# Patient Record
Sex: FEMALE | Race: WHITE | NOT HISPANIC OR LATINO | URBAN - METROPOLITAN AREA
[De-identification: names, ages, dates, MRNs, and addresses within clinical notes are randomized per-mention and may not be internally consistent; named-entity substitution may affect disease eponyms.]

---

## 2023-09-18 ENCOUNTER — HOSPITAL ENCOUNTER (OUTPATIENT)
Dept: DATA CONVERSION | Facility: HOSPITAL | Age: 58
Discharge: HOME | End: 2023-09-18

## 2023-09-18 DIAGNOSIS — R07.9 CHEST PAIN, UNSPECIFIED: ICD-10-CM

## 2023-10-11 NOTE — PROGRESS NOTES
This is a 58 year old female for UC FU for EVAL of LEG PAIN nearly 2 weeks ago and two weeks before that for LEG PAIN.    HAS PAIN THIGH and UPPER THIGH and GROIN and Rx with STEROIDS and MUSCLE RELAXERS and ENTIRE LEG and CALF.    ALSO HAD CP at first visit to UC but that resolved with NSAID.  NO FURTHER CP or SOB    SMOKER  AGE 58   NO HRT  NO LONG CAR TRIPS    NO CP or SOB    ROS is NEG for HEADACHE, NAUSEA, VOMITING, DIARRHEA, CHEST PAIN, SOB, and BLEEDING and as further REVIEWED BELOW.      Subjective   Kayley Johnson is a 58 y.o. female who presents for No chief complaint on file..    HPI:        Review of systems is essentially negative for all systems except for any identified issues in HPI above.    Objective     There were no vitals taken for this visit.     Physical Examination:       GENERAL           General Appearance: well-appearing, well-developed, well-hydrated, well-nourished, no acute distress.        HEENT           NECK supple, no masses or thyromegaly, no carotid bruit.        EYES           Extraocular Movements: normal, bilateral eyes ESTHER, no conjunctival injection.        HEART           Rate and Rhythm regular rate and rhythm. Heart sounds: normal S1S2, no S3 or S4. Murmurs: none.        CHEST           Breath sounds: Clear to IPPA, RR<16 no use of accessory muscles.        ABDOMEN           General: Neg for LKKS or masses, no scleral icterus or jaundice.        MUSCULOSKELETAL           Joints Demonstration: Neg for erythema, swelling or joint deformities. gross abnormalities no gross abnormalities.        EXTREMITIES           Lower Extremities: Neg for cyanosis, clubbing or edema.       Assessment/Plan   Problem List Items Addressed This Visit    None      FOLLOW UP:  PRN and as specified above         Xiomara Tellez M.D.

## 2023-10-18 PROBLEM — B00.9 HERPES: Status: ACTIVE | Noted: 2023-10-18

## 2023-10-18 PROBLEM — F17.200 TOBACCO USE DISORDER: Status: ACTIVE | Noted: 2023-10-18

## 2023-10-18 PROBLEM — N95.0 POST-MENOPAUSE BLEEDING: Status: ACTIVE | Noted: 2023-10-18

## 2023-10-18 RX ORDER — IBUPROFEN 200 MG
1 TABLET ORAL EVERY 24 HOURS
COMMUNITY
Start: 2020-01-08 | End: 2023-11-13 | Stop reason: WASHOUT

## 2023-10-19 ENCOUNTER — LAB (OUTPATIENT)
Dept: LAB | Facility: LAB | Age: 58
End: 2023-10-19
Payer: COMMERCIAL

## 2023-10-19 ENCOUNTER — OFFICE VISIT (OUTPATIENT)
Dept: PRIMARY CARE | Facility: CLINIC | Age: 58
End: 2023-10-19
Payer: COMMERCIAL

## 2023-10-19 ENCOUNTER — HOSPITAL ENCOUNTER (OUTPATIENT)
Dept: RADIOLOGY | Facility: HOSPITAL | Age: 58
Discharge: HOME | End: 2023-10-19
Payer: COMMERCIAL

## 2023-10-19 VITALS
WEIGHT: 103 LBS | TEMPERATURE: 97.6 F | DIASTOLIC BLOOD PRESSURE: 70 MMHG | HEART RATE: 85 BPM | SYSTOLIC BLOOD PRESSURE: 120 MMHG | OXYGEN SATURATION: 98 %

## 2023-10-19 DIAGNOSIS — M79.606 PAIN OF LOWER EXTREMITY, UNSPECIFIED LATERALITY: ICD-10-CM

## 2023-10-19 DIAGNOSIS — R07.89 OTHER CHEST PAIN: Primary | ICD-10-CM

## 2023-10-19 DIAGNOSIS — Z09 HOSPITAL DISCHARGE FOLLOW-UP: ICD-10-CM

## 2023-10-19 DIAGNOSIS — Z71.85 IMMUNIZATION COUNSELING: ICD-10-CM

## 2023-10-19 DIAGNOSIS — Z71.9 HEALTH COUNSELING: ICD-10-CM

## 2023-10-19 LAB
ANION GAP SERPL CALC-SCNC: 10 MMOL/L
BASOPHILS # BLD AUTO: 0.11 X10*3/UL (ref 0–0.1)
BASOPHILS NFR BLD AUTO: 0.8 %
BUN SERPL-MCNC: 18 MG/DL (ref 8–25)
CALCIUM SERPL-MCNC: 9.6 MG/DL (ref 8.5–10.4)
CHLORIDE SERPL-SCNC: 102 MMOL/L (ref 97–107)
CO2 SERPL-SCNC: 26 MMOL/L (ref 24–31)
CREAT SERPL-MCNC: 0.7 MG/DL (ref 0.4–1.6)
D DIMER PPP FEU-MCNC: 0.3 MG/L FEU (ref 0.19–0.5)
EOSINOPHIL # BLD AUTO: 0.23 X10*3/UL (ref 0–0.7)
EOSINOPHIL NFR BLD AUTO: 1.7 %
ERYTHROCYTE [DISTWIDTH] IN BLOOD BY AUTOMATED COUNT: 14 % (ref 11.5–14.5)
GFR SERPL CREATININE-BSD FRML MDRD: >90 ML/MIN/1.73M*2
GLUCOSE SERPL-MCNC: 96 MG/DL (ref 65–99)
HCT VFR BLD AUTO: 41.7 % (ref 36–46)
HGB BLD-MCNC: 13.7 G/DL (ref 12–16)
IMM GRANULOCYTES # BLD AUTO: 0.04 X10*3/UL (ref 0–0.7)
IMM GRANULOCYTES NFR BLD AUTO: 0.3 % (ref 0–0.9)
LYMPHOCYTES # BLD AUTO: 3.99 X10*3/UL (ref 1.2–4.8)
LYMPHOCYTES NFR BLD AUTO: 29.8 %
MCH RBC QN AUTO: 30.4 PG (ref 26–34)
MCHC RBC AUTO-ENTMCNC: 32.9 G/DL (ref 32–36)
MCV RBC AUTO: 93 FL (ref 80–100)
MONOCYTES # BLD AUTO: 1.09 X10*3/UL (ref 0.1–1)
MONOCYTES NFR BLD AUTO: 8.2 %
NEUTROPHILS # BLD AUTO: 7.91 X10*3/UL (ref 1.2–7.7)
NEUTROPHILS NFR BLD AUTO: 59.2 %
NRBC BLD-RTO: 0 /100 WBCS (ref 0–0)
PLATELET # BLD AUTO: 433 X10*3/UL (ref 150–450)
PMV BLD AUTO: 9.8 FL (ref 7.5–11.5)
POTASSIUM SERPL-SCNC: 4.5 MMOL/L (ref 3.4–5.1)
RBC # BLD AUTO: 4.5 X10*6/UL (ref 4–5.2)
SODIUM SERPL-SCNC: 138 MMOL/L (ref 133–145)
WBC # BLD AUTO: 13.4 X10*3/UL (ref 4.4–11.3)

## 2023-10-19 PROCEDURE — 4004F PT TOBACCO SCREEN RCVD TLK: CPT | Performed by: FAMILY MEDICINE

## 2023-10-19 PROCEDURE — 2550000001 HC RX 255 CONTRASTS: Performed by: FAMILY MEDICINE

## 2023-10-19 PROCEDURE — 99214 OFFICE O/P EST MOD 30 MIN: CPT | Performed by: FAMILY MEDICINE

## 2023-10-19 PROCEDURE — 80048 BASIC METABOLIC PNL TOTAL CA: CPT

## 2023-10-19 PROCEDURE — 85025 COMPLETE CBC W/AUTO DIFF WBC: CPT

## 2023-10-19 PROCEDURE — 36415 COLL VENOUS BLD VENIPUNCTURE: CPT

## 2023-10-19 PROCEDURE — 85300 ANTITHROMBIN III ACTIVITY: CPT

## 2023-10-19 PROCEDURE — 71275 CT ANGIOGRAPHY CHEST: CPT

## 2023-10-19 RX ADMIN — IOHEXOL 75 ML: 350 INJECTION, SOLUTION INTRAVENOUS at 19:13

## 2023-10-19 ASSESSMENT — PATIENT HEALTH QUESTIONNAIRE - PHQ9
1. LITTLE INTEREST OR PLEASURE IN DOING THINGS: NOT AT ALL
2. FEELING DOWN, DEPRESSED OR HOPELESS: NOT AT ALL
SUM OF ALL RESPONSES TO PHQ9 QUESTIONS 1 AND 2: 0

## 2023-10-19 ASSESSMENT — ENCOUNTER SYMPTOMS
LOSS OF SENSATION IN FEET: 0
OCCASIONAL FEELINGS OF UNSTEADINESS: 0
DEPRESSION: 0

## 2023-10-20 ENCOUNTER — TELEPHONE (OUTPATIENT)
Dept: PRIMARY CARE | Facility: CLINIC | Age: 58
End: 2023-10-20
Payer: COMMERCIAL

## 2023-10-20 NOTE — TELEPHONE ENCOUNTER
Patient left message stating she was sent for a STAT CT and US yesterday. Stating the US was never scheduled as a STAT so it did not get done. Requesting a call back to try and get this done today if possible. She did complete the CT.

## 2023-10-20 NOTE — TELEPHONE ENCOUNTER
PT called in requesting STAT order for ultrasound of left leg. States it was not ordered as STAT at appointment yesterday. Please call PT at [phone]   yes

## 2023-10-20 NOTE — TELEPHONE ENCOUNTER
Pt called back stating that Seth could get her scheduled for US on Monday at 8:00 AM. PT states that she would have to take another day off work to do that, which she would need a note from the doctor. States that Dr. Tellez wanted the US done yesterday, but still needs the order placed as STAT. Please advise.

## 2023-10-20 NOTE — TELEPHONE ENCOUNTER
Per lab encounter- dr love would like pt to have an appointment to discuss pain with her and possible treatments. Please schedule pt

## 2023-10-23 ENCOUNTER — APPOINTMENT (OUTPATIENT)
Dept: VASCULAR MEDICINE | Facility: CLINIC | Age: 58
End: 2023-10-23
Payer: COMMERCIAL

## 2023-11-02 ENCOUNTER — APPOINTMENT (OUTPATIENT)
Dept: PRIMARY CARE | Facility: CLINIC | Age: 58
End: 2023-11-02
Payer: COMMERCIAL

## 2023-11-03 NOTE — PROGRESS NOTES
"This is a 58 year old female for FU visit from having HOSP EVAL for CHEST PRESSURE and ruled out for MI and ruled out for DVT       ATTENDED  CT SCAN  NEG PE but POS for EMPHYSEMA    CONTINUES TO SMOKE 1PPD and wishes to cease    IMPRESSION:  1. No pulmonary embolus.  2. Moderate diffuse centrilobular emphysema.  3. No acute consolidation or evidence for acute thoracic pathology.          Signed by: Myles Sanchez 10/19/2023 10:00 PM  Dictation workstation:   [id number]      ROS is NEG for HEADACHE, NAUSEA, VOMITING, DIARRHEA, CHEST PAIN, SOB, and BLEEDING and as further REVIEWED BELOW      .Subjective   Kayley Johnson is a 58 y.o. female who presents for Follow-up.    HPI:        Review of systems is essentially negative for all systems except for any identified issues in HPI above.    Objective     /78   Pulse 74   Temp 36.4 °C (97.6 °F)   Ht 1.626 m (5' 4\")   Wt 48.1 kg (106 lb)   SpO2 96%   BMI 18.19 kg/m²      Physical Examination:       GENERAL           General Appearance: well-appearing, well-developed, well-hydrated, well-nourished, no acute distress.        HEENT           NECK supple, no masses or thyromegaly, no carotid bruit.        EYES           Extraocular Movements: normal, bilateral eyes ESTHER, no conjunctival injection.        HEART           Rate and Rhythm regular rate and rhythm. Heart sounds: normal S1S2, no S3 or S4. Murmurs: none.        CHEST           Breath sounds: Clear to IPPA, RR<16 no use of accessory muscles.        ABDOMEN           General: Neg for LKKS or masses, no scleral icterus or jaundice.        MUSCULOSKELETAL           Joints Demonstration: Neg for erythema, swelling or joint deformities. gross abnormalities no gross abnormalities.        EXTREMITIES           Lower Extremities: Neg for cyanosis, clubbing or edema.       Assessment/Plan   Problem List Items Addressed This Visit    None  Visit Diagnoses       Health counseling    -  Primary    Immunization " counseling                FOLLOW UP:  PRN and as specified above         Xiomara Tellez M.D.

## 2023-11-13 ENCOUNTER — OFFICE VISIT (OUTPATIENT)
Dept: PRIMARY CARE | Facility: CLINIC | Age: 58
End: 2023-11-13
Payer: COMMERCIAL

## 2023-11-13 VITALS
TEMPERATURE: 97.6 F | HEIGHT: 64 IN | BODY MASS INDEX: 18.1 KG/M2 | DIASTOLIC BLOOD PRESSURE: 78 MMHG | WEIGHT: 106 LBS | SYSTOLIC BLOOD PRESSURE: 122 MMHG | OXYGEN SATURATION: 96 % | HEART RATE: 74 BPM

## 2023-11-13 DIAGNOSIS — Z71.85 IMMUNIZATION COUNSELING: ICD-10-CM

## 2023-11-13 DIAGNOSIS — Z72.0 TOBACCO USE: ICD-10-CM

## 2023-11-13 DIAGNOSIS — M79.605 PAIN OF LEFT LOWER EXTREMITY: ICD-10-CM

## 2023-11-13 DIAGNOSIS — J43.9 PULMONARY EMPHYSEMA, UNSPECIFIED EMPHYSEMA TYPE (MULTI): ICD-10-CM

## 2023-11-13 DIAGNOSIS — Z71.9 HEALTH COUNSELING: Primary | ICD-10-CM

## 2023-11-13 DIAGNOSIS — R07.89 ATYPICAL CHEST PAIN: ICD-10-CM

## 2023-11-13 PROCEDURE — 4004F PT TOBACCO SCREEN RCVD TLK: CPT | Performed by: FAMILY MEDICINE

## 2023-11-13 PROCEDURE — 99214 OFFICE O/P EST MOD 30 MIN: CPT | Performed by: FAMILY MEDICINE

## 2023-11-13 PROCEDURE — 90662 IIV NO PRSV INCREASED AG IM: CPT | Performed by: FAMILY MEDICINE

## 2023-11-13 RX ORDER — FLUTICASONE PROPIONATE AND SALMETEROL 250; 50 UG/1; UG/1
1 POWDER RESPIRATORY (INHALATION)
Qty: 60 EACH | Refills: 11 | Status: SHIPPED | OUTPATIENT
Start: 2023-11-13 | End: 2024-11-12

## 2023-11-13 RX ORDER — IBUPROFEN 200 MG
1 TABLET ORAL EVERY 24 HOURS
Qty: 30 PATCH | Refills: 0 | Status: SHIPPED | OUTPATIENT
Start: 2023-11-13 | End: 2024-03-22 | Stop reason: SDUPTHER

## 2023-11-13 RX ORDER — ALBUTEROL SULFATE 90 UG/1
2 AEROSOL, METERED RESPIRATORY (INHALATION) EVERY 4 HOURS PRN
Qty: 8 G | Refills: 5 | Status: SHIPPED | OUTPATIENT
Start: 2023-11-13 | End: 2024-11-12

## 2023-11-13 ASSESSMENT — PATIENT HEALTH QUESTIONNAIRE - PHQ9
2. FEELING DOWN, DEPRESSED OR HOPELESS: NOT AT ALL
SUM OF ALL RESPONSES TO PHQ9 QUESTIONS 1 AND 2: 0
1. LITTLE INTEREST OR PLEASURE IN DOING THINGS: NOT AT ALL

## 2023-11-13 ASSESSMENT — PAIN SCALES - GENERAL: PAINLEVEL: 0-NO PAIN

## 2023-12-28 ENCOUNTER — CLINICAL SUPPORT (OUTPATIENT)
Dept: VASCULAR MEDICINE | Facility: CLINIC | Age: 58
End: 2023-12-28
Payer: COMMERCIAL

## 2023-12-28 DIAGNOSIS — M79.605 PAIN OF LEFT LOWER EXTREMITY: ICD-10-CM

## 2023-12-28 PROCEDURE — 93971 EXTREMITY STUDY: CPT

## 2023-12-28 PROCEDURE — 93971 EXTREMITY STUDY: CPT | Performed by: INTERNAL MEDICINE

## 2024-01-03 ENCOUNTER — OFFICE VISIT (OUTPATIENT)
Dept: CARDIOLOGY | Facility: CLINIC | Age: 59
End: 2024-01-03
Payer: COMMERCIAL

## 2024-01-03 ENCOUNTER — HOSPITAL ENCOUNTER (OUTPATIENT)
Dept: CARDIOLOGY | Facility: CLINIC | Age: 59
Discharge: HOME | End: 2024-01-03
Payer: COMMERCIAL

## 2024-01-03 VITALS
BODY MASS INDEX: 18.27 KG/M2 | DIASTOLIC BLOOD PRESSURE: 68 MMHG | HEART RATE: 67 BPM | SYSTOLIC BLOOD PRESSURE: 102 MMHG | OXYGEN SATURATION: 95 % | WEIGHT: 107 LBS | HEIGHT: 64 IN | RESPIRATION RATE: 16 BRPM

## 2024-01-03 DIAGNOSIS — R07.89 ATYPICAL CHEST PAIN: ICD-10-CM

## 2024-01-03 DIAGNOSIS — R00.2 PALPITATIONS: ICD-10-CM

## 2024-01-03 DIAGNOSIS — R07.9 CHEST PAIN, UNSPECIFIED TYPE: ICD-10-CM

## 2024-01-03 DIAGNOSIS — R06.02 SHORTNESS OF BREATH: ICD-10-CM

## 2024-01-03 PROCEDURE — 99214 OFFICE O/P EST MOD 30 MIN: CPT | Performed by: INTERNAL MEDICINE

## 2024-01-03 PROCEDURE — 93010 ELECTROCARDIOGRAM REPORT: CPT | Performed by: INTERNAL MEDICINE

## 2024-01-03 PROCEDURE — 99204 OFFICE O/P NEW MOD 45 MIN: CPT | Performed by: INTERNAL MEDICINE

## 2024-01-03 PROCEDURE — 93005 ELECTROCARDIOGRAM TRACING: CPT

## 2024-01-03 ASSESSMENT — PAIN SCALES - GENERAL
PAINLEVEL: 0-NO PAIN
PAINLEVEL: 0-NO PAIN

## 2024-01-03 ASSESSMENT — ENCOUNTER SYMPTOMS
LOSS OF SENSATION IN FEET: 0
DEPRESSION: 0
OCCASIONAL FEELINGS OF UNSTEADINESS: 0

## 2024-01-03 NOTE — PROGRESS NOTES
Subjective   Kayley Johnson is a 58 y.o. female.    Chief Complaint:  This is a 58 year old female for FU visit from having [facility] EVAL for CHEST PRESSURE and SOB.  HPI  This is a 57 y/o female here today for an ER follow up visit. She was seen at [facility] in October with chest pain, leg pain, and sob- see notes. Complains of rare heart palpitations. Reviewed medical /surgical history, lab/test results, and current medications. She is a current 1 PPD cigarette smoker and is planning on quitting soon. An EKG was done today.    ROS    Objective   Physical Exam  Gen.: Pleasant,  y/o  in no obvious distress.   HEENT: Normal EOMs without thyromegaly or lymphadenopathy.  Lungs: Clear with good air movement no crackles or wheezing.  Carotid: Normal JVP and carotid upstrokes without bruit.   Cardiac: There is a normal S1 and S2 with normal heart tones and no murmur rub or S3.  Abdomen: Nontender with normal bowel sounds and no bruits.  Extremities: No edema.  Skin: No acute rash.  Neuro: Intact without focal motor deficit.  Vascular: Normal radial pulses bilaterally. Normal distal pulses bilaterally.      Lab Review:   Lab on 10/19/2023   Component Date Value    D-Dimer Non VTE, Quant (* 10/19/2023 0.30     WBC 10/19/2023 13.4 (H)     nRBC 10/19/2023 0.0     RBC 10/19/2023 4.50     Hemoglobin 10/19/2023 13.7     Hematocrit 10/19/2023 41.7     MCV 10/19/2023 93     MCH 10/19/2023 30.4     MCHC 10/19/2023 32.9     RDW 10/19/2023 14.0     Platelets 10/19/2023 433     MPV 10/19/2023 9.8     Neutrophils % 10/19/2023 59.2     Immature Granulocytes %,* 10/19/2023 0.3     Lymphocytes % 10/19/2023 29.8     Monocytes % 10/19/2023 8.2     Eosinophils % 10/19/2023 1.7     Basophils % 10/19/2023 0.8     Neutrophils Absolute 10/19/2023 7.91 (H)     Immature Granulocytes Ab* 10/19/2023 0.04     Lymphocytes Absolute 10/19/2023 3.99     Monocytes Absolute 10/19/2023 1.09 (H)     Eosinophils Absolute 10/19/2023 0.23     Basophils Absolute  10/19/2023 0.11 (H)     Glucose 10/19/2023 96     Sodium 10/19/2023 138     Potassium 10/19/2023 4.5     Chloride 10/19/2023 102     Bicarbonate 10/19/2023 26     Urea Nitrogen 10/19/2023 18     Creatinine 10/19/2023 0.70     eGFR 10/19/2023 >90     Calcium 10/19/2023 9.6     Anion Gap 10/19/2023 10        Assessment/Plan   There were no encounter diagnoses.

## 2024-01-08 LAB
ATRIAL RATE: 67 BPM
P AXIS: 81 DEGREES
P OFFSET: 198 MS
P ONSET: 147 MS
PR INTERVAL: 148 MS
Q ONSET: 221 MS
QRS COUNT: 12 BEATS
QRS DURATION: 86 MS
QT INTERVAL: 398 MS
QTC CALCULATION(BAZETT): 420 MS
QTC FREDERICIA: 413 MS
R AXIS: 73 DEGREES
T AXIS: 66 DEGREES
T OFFSET: 420 MS
VENTRICULAR RATE: 67 BPM

## 2024-03-06 ENCOUNTER — HOSPITAL ENCOUNTER (OUTPATIENT)
Dept: RADIOLOGY | Facility: HOSPITAL | Age: 59
Discharge: HOME | End: 2024-03-06
Payer: COMMERCIAL

## 2024-03-06 DIAGNOSIS — R06.02 SHORTNESS OF BREATH: ICD-10-CM

## 2024-03-06 DIAGNOSIS — R07.89 ATYPICAL CHEST PAIN: ICD-10-CM

## 2024-03-06 PROCEDURE — 75571 CT HRT W/O DYE W/CA TEST: CPT

## 2024-03-22 ENCOUNTER — PATIENT MESSAGE (OUTPATIENT)
Dept: PRIMARY CARE | Facility: CLINIC | Age: 59
End: 2024-03-22
Payer: COMMERCIAL

## 2024-03-22 DIAGNOSIS — Z72.0 TOBACCO USE: ICD-10-CM

## 2024-03-22 RX ORDER — IBUPROFEN 200 MG
1 TABLET ORAL EVERY 24 HOURS
Qty: 30 PATCH | Refills: 0 | Status: SHIPPED | OUTPATIENT
Start: 2024-03-22 | End: 2024-04-21

## 2024-04-03 ENCOUNTER — OFFICE VISIT (OUTPATIENT)
Dept: CARDIOLOGY | Facility: CLINIC | Age: 59
End: 2024-04-03
Payer: COMMERCIAL

## 2024-04-03 VITALS
DIASTOLIC BLOOD PRESSURE: 68 MMHG | HEART RATE: 70 BPM | WEIGHT: 104 LBS | HEIGHT: 64 IN | OXYGEN SATURATION: 98 % | SYSTOLIC BLOOD PRESSURE: 138 MMHG | RESPIRATION RATE: 16 BRPM | BODY MASS INDEX: 17.75 KG/M2

## 2024-04-03 DIAGNOSIS — R00.2 PALPITATIONS: ICD-10-CM

## 2024-04-03 DIAGNOSIS — R07.89 OTHER CHEST PAIN: ICD-10-CM

## 2024-04-03 DIAGNOSIS — R06.02 SHORTNESS OF BREATH: ICD-10-CM

## 2024-04-03 DIAGNOSIS — R07.9 CHEST PAIN, UNSPECIFIED TYPE: Primary | ICD-10-CM

## 2024-04-03 PROCEDURE — 99214 OFFICE O/P EST MOD 30 MIN: CPT | Performed by: INTERNAL MEDICINE

## 2024-04-03 RX ORDER — ATORVASTATIN CALCIUM 40 MG/1
40 TABLET, FILM COATED ORAL DAILY
Qty: 90 TABLET | Refills: 3 | Status: SHIPPED | OUTPATIENT
Start: 2024-04-03

## 2024-04-03 ASSESSMENT — ENCOUNTER SYMPTOMS
LOSS OF SENSATION IN FEET: 0
DEPRESSION: 0
OCCASIONAL FEELINGS OF UNSTEADINESS: 0

## 2024-04-03 ASSESSMENT — PAIN SCALES - GENERAL: PAINLEVEL: 0-NO PAIN

## 2024-04-03 NOTE — PROGRESS NOTES
Subjective   Kayley Johnson is a 58 y.o. female.    Chief Complaint:  This is a 58 year old female for FU visit  for chest pain, heart palpitations and Dyspnea.     HPI  This is a 57 y/o female here today for a three month Cardiology follow up visit. She was seen at [facility] in October with chest pain, leg pain, and sob- see notes. Complains of rare heart palpitations. Reviewed lab work results and current medications. She is a former cigarette smoker and continues to have an occasional cigarette. Her CT Cardiac Calcium score is 83.     Objective   Physical Exam  Gen.: Pleasant,  y/o  in no obvious distress.   HEENT: Normal EOMs without thyromegaly or lymphadenopathy.  Lungs: Clear with good air movement no crackles or wheezing.  Carotid: Normal JVP and carotid upstrokes without bruit.   Cardiac: There is a normal S1 and S2 with normal heart tones and no murmur rub or S3.  Abdomen: Nontender with normal bowel sounds and no bruits.  Extremities: No edema.  Skin: No acute rash.  Neuro: Intact without focal motor deficit.  Vascular: Normal radial pulses bilaterally. Normal distal pulses bilaterally.    Lab Review:   Office Visit on 01/03/2024   Component Date Value    Ventricular Rate 01/04/2024 67     Atrial Rate 01/04/2024 67     AL Interval 01/04/2024 148     QRS Duration 01/04/2024 86     QT Interval 01/04/2024 398     QTC Calculation(Bazett) 01/04/2024 420     P Axis 01/04/2024 81     R Axis 01/04/2024 73     T Axis 01/04/2024 66     QRS Count 01/04/2024 12     Q Onset 01/04/2024 221     P Onset 01/04/2024 147     P Offset 01/04/2024 198     T Offset 01/04/2024 420     QTC Fredericia 01/04/2024 413    Lab on 10/19/2023   Component Date Value    D-Dimer Non VTE, Quant (* 10/19/2023 0.30     WBC 10/19/2023 13.4 (H)     nRBC 10/19/2023 0.0     RBC 10/19/2023 4.50     Hemoglobin 10/19/2023 13.7     Hematocrit 10/19/2023 41.7     MCV 10/19/2023 93     MCH 10/19/2023 30.4     MCHC 10/19/2023 32.9     RDW 10/19/2023  14.0     Platelets 10/19/2023 433     MPV 10/19/2023 9.8     Neutrophils % 10/19/2023 59.2     Immature Granulocytes %,* 10/19/2023 0.3     Lymphocytes % 10/19/2023 29.8     Monocytes % 10/19/2023 8.2     Eosinophils % 10/19/2023 1.7     Basophils % 10/19/2023 0.8     Neutrophils Absolute 10/19/2023 7.91 (H)     Immature Granulocytes Ab* 10/19/2023 0.04     Lymphocytes Absolute 10/19/2023 3.99     Monocytes Absolute 10/19/2023 1.09 (H)     Eosinophils Absolute 10/19/2023 0.23     Basophils Absolute 10/19/2023 0.11 (H)     Glucose 10/19/2023 96     Sodium 10/19/2023 138     Potassium 10/19/2023 4.5     Chloride 10/19/2023 102     Bicarbonate 10/19/2023 26     Urea Nitrogen 10/19/2023 18     Creatinine 10/19/2023 0.70     eGFR 10/19/2023 >90     Calcium 10/19/2023 9.6     Anion Gap 10/19/2023 10        Assessment/Plan   There were no encounter diagnoses.    1.  Coronary artery disease.  This patient has cardiac risk factors that are negative for hypertension and diabetes but positive for chronic smoking of 1 pack/day..  The patient did have an EKG performed on 1/3/2024  Demonstrating sinus rhythm with left ventricular hypertrophy by voltage.  In the absence of any concerning symptoms and in view of the lower CT calcium score will consider stress testing at this time but she will be started on treatment for hyperlipidemia.  Continue medical return visit in 8/2024 she will be scheduled for an echo will also be scheduled for an abdominal ultrasound to rule out abdominal aortic aneurysm given her history of smoking.  Patient works as a  at [employer]    2.  Thoracic aortic aneurysm.  The patient's CT coronary calcium score measured the ascending thoracic aorta diameter at 3.7 cm in addition to demonstrating close emphysema (to 2 cm.  She will have an echocardiogram performed at next visit in 8/2024 to assess her thoracic aorta.    3.  Chronic smoking.  Smoking cessation  encouraged.    4.  COPD.  Patient had a CT angiogram of the chest on 10/19/2023 negative for pulmonary embolization but showing moderate diffuse centrilobular emphysema with apical fibrotic changes right greater than left.    5.  Hyperlipidemia.  The patient has actually not had a lipid panel drawn and this will be ordered before her next visit.  Given the findings on her CT coronary calcium score she will be started on atorvastatin 40 mg daily.    6.  Status post  section x 2.    7.  Status post right pulm operation.    8.?  Family history of heart disease.  Patient's father evidently had aortic valve insufficiency

## 2024-04-03 NOTE — PATIENT INSTRUCTIONS
Follow up in six months with an ECHO and Abdominal U/S ([facility])  Obtain a Lipid panel this week  Take Atorvastatin 40 mg once daily

## 2024-04-04 NOTE — PROGRESS NOTES
NO show was in fact NOT a no show but RECEPTION DESK human error when she was checked in the wrong item was checked as a NO SHOW--so patient is GIVEN a COURTESY CALL Wednesday April 10th)  from Dr Tellez to apologize that this happened to her.    SEEKING REFERRAL to PULMONARY due to findings of EMPHYSEMA  REFERRED to VASC SURG for arterial testing of legs:    She had referred you to these due to the pain in the left lower extremity- per her note. If you are having ongoing concerns and would like her to re evaluate you, please call us to schedule at [phone]. The specialist is call to schedule at [phone]          She has visit scheduled on Thursday this week  for REFERRAL TO VASC SURG for ARTERIAL TESTING LEGS for pain in limbs and also to PULM for emphysema

## 2024-04-08 ENCOUNTER — LAB (OUTPATIENT)
Dept: LAB | Facility: LAB | Age: 59
End: 2024-04-08
Payer: COMMERCIAL

## 2024-04-08 DIAGNOSIS — R06.02 SHORTNESS OF BREATH: ICD-10-CM

## 2024-04-08 DIAGNOSIS — R00.2 PALPITATIONS: ICD-10-CM

## 2024-04-08 DIAGNOSIS — R07.9 CHEST PAIN, UNSPECIFIED TYPE: ICD-10-CM

## 2024-04-08 LAB
CHOLEST SERPL-MCNC: 194 MG/DL (ref 0–199)
CHOLESTEROL/HDL RATIO: 3.2
HDLC SERPL-MCNC: 60.6 MG/DL
LDLC SERPL CALC-MCNC: 101 MG/DL
NON HDL CHOLESTEROL: 133 MG/DL (ref 0–149)
TRIGL SERPL-MCNC: 161 MG/DL (ref 0–149)
VLDL: 32 MG/DL (ref 0–40)

## 2024-04-08 PROCEDURE — 36415 COLL VENOUS BLD VENIPUNCTURE: CPT

## 2024-04-08 PROCEDURE — 80061 LIPID PANEL: CPT

## 2024-04-09 ENCOUNTER — OFFICE VISIT (OUTPATIENT)
Dept: PRIMARY CARE | Facility: CLINIC | Age: 59
End: 2024-04-09
Payer: COMMERCIAL

## 2024-04-09 DIAGNOSIS — Z72.0 TOBACCO USE: Primary | ICD-10-CM

## 2024-04-09 DIAGNOSIS — J43.9 PULMONARY EMPHYSEMA, UNSPECIFIED EMPHYSEMA TYPE (MULTI): ICD-10-CM

## 2024-04-09 DIAGNOSIS — R07.89 OTHER CHEST PAIN: ICD-10-CM

## 2024-04-09 DIAGNOSIS — R07.89 ATYPICAL CHEST PAIN: ICD-10-CM

## 2024-04-11 ENCOUNTER — OFFICE VISIT (OUTPATIENT)
Dept: PRIMARY CARE | Facility: CLINIC | Age: 59
End: 2024-04-11
Payer: COMMERCIAL

## 2024-04-11 VITALS
DIASTOLIC BLOOD PRESSURE: 70 MMHG | WEIGHT: 102 LBS | SYSTOLIC BLOOD PRESSURE: 124 MMHG | OXYGEN SATURATION: 98 % | TEMPERATURE: 98 F | HEART RATE: 61 BPM | BODY MASS INDEX: 17.51 KG/M2

## 2024-04-11 DIAGNOSIS — Z87.891 QUIT SMOKING: ICD-10-CM

## 2024-04-11 DIAGNOSIS — M79.604 PAIN IN BOTH LOWER EXTREMITIES: ICD-10-CM

## 2024-04-11 DIAGNOSIS — R07.89 ATYPICAL CHEST PAIN: ICD-10-CM

## 2024-04-11 DIAGNOSIS — R07.89 OTHER CHEST PAIN: ICD-10-CM

## 2024-04-11 DIAGNOSIS — M79.606 PAIN OF LOWER EXTREMITY, UNSPECIFIED LATERALITY: ICD-10-CM

## 2024-04-11 DIAGNOSIS — M79.605 PAIN OF LEFT LOWER EXTREMITY: ICD-10-CM

## 2024-04-11 DIAGNOSIS — M79.605 PAIN IN BOTH LOWER EXTREMITIES: ICD-10-CM

## 2024-04-11 DIAGNOSIS — J43.9 PULMONARY EMPHYSEMA, UNSPECIFIED EMPHYSEMA TYPE (MULTI): Primary | ICD-10-CM

## 2024-04-11 DIAGNOSIS — F43.9 STRESS: ICD-10-CM

## 2024-04-11 PROCEDURE — 99214 OFFICE O/P EST MOD 30 MIN: CPT | Performed by: FAMILY MEDICINE

## 2024-04-11 ASSESSMENT — PATIENT HEALTH QUESTIONNAIRE - PHQ9
1. LITTLE INTEREST OR PLEASURE IN DOING THINGS: NOT AT ALL
SUM OF ALL RESPONSES TO PHQ9 QUESTIONS 1 AND 2: 0
2. FEELING DOWN, DEPRESSED OR HOPELESS: NOT AT ALL

## 2024-04-11 ASSESSMENT — PAIN SCALES - GENERAL: PAINLEVEL: 0-NO PAIN

## 2024-04-11 NOTE — PROGRESS NOTES
"Eladio is a 58 year old female for \"REFERRALS\" and states \"MY LIFE IS CRAZY, got back with a BF who now has CANCER and is in ICU and DAD in a home with Fx Hip\"    NEEDS COUNSELING for stress but NO THOUGHTS of self or other harm    RECENTLY QUIT SMOKING     Xiomara Hess MD  10/20/2023  7:41 AM EDT       CT ANGIO shows no PE consistent with the NEG D Dimer also seen on labs but DOES show MODERATE EMPHYSEMA and pt has elevated WBC count consistent with recent illness so set up a DEDICATED elective FU visit for the elevated WBC as noted in lab report commented upon today.         ROS is NEG for HEADACHE, NAUSEA, VOMITING, DIARRHEA, CHEST PAIN, SOB, and BLEEDING and as further REVIEWED BELOW.    Subjective   Kayley Johnson is a 58 y.o. female who presents for No chief complaint on file..    HPI:    Per nursing intake, pt here for No chief complaint on file.       Review of systems is essentially negative for all systems except for any identified issues in HPI above.    Objective     There were no vitals taken for this visit.     Physical Examination:       GENERAL           General Appearance: well-appearing, well-developed, well-hydrated, well-nourished, no acute distress.        HEENT           NECK supple, no masses or thyromegaly, no carotid bruit.        EYES           Extraocular Movements: normal, bilateral eyes ESTHER, no conjunctival injection.        HEART           Rate and Rhythm regular rate and rhythm. Heart sounds: normal S1S2, no S3 or S4. Murmurs: none.        CHEST           Breath sounds: Clear to IPPA, RR<16 no use of accessory muscles.        ABDOMEN           General: Neg for LKKS or masses, no scleral icterus or jaundice.        MUSCULOSKELETAL           Joints Demonstration: Neg for erythema, swelling or joint deformities. gross abnormalities no gross abnormalities.        EXTREMITIES           Lower Extremities: Neg for cyanosis, clubbing or edema.       Assessment/Plan   Problem List " Items Addressed This Visit    None  Visit Diagnoses       Atypical chest pain    -  Primary    Pulmonary emphysema, unspecified emphysema type (CMS/HCC)        Other chest pain        Pain of left lower extremity        Pain of lower extremity, unspecified laterality        Tobacco use                FOLLOW UP:  PRN and as specified above         Xiomara Tellez M.D.

## 2024-05-09 ENCOUNTER — OFFICE VISIT (OUTPATIENT)
Dept: VASCULAR SURGERY | Facility: CLINIC | Age: 59
End: 2024-05-09
Payer: COMMERCIAL

## 2024-05-09 VITALS
BODY MASS INDEX: 17.64 KG/M2 | WEIGHT: 103.3 LBS | DIASTOLIC BLOOD PRESSURE: 70 MMHG | OXYGEN SATURATION: 96 % | SYSTOLIC BLOOD PRESSURE: 120 MMHG | HEART RATE: 79 BPM | HEIGHT: 64 IN

## 2024-05-09 DIAGNOSIS — M79.604 PAIN IN BOTH LOWER EXTREMITIES: ICD-10-CM

## 2024-05-09 DIAGNOSIS — M79.605 PAIN IN BOTH LOWER EXTREMITIES: ICD-10-CM

## 2024-05-09 PROCEDURE — 99214 OFFICE O/P EST MOD 30 MIN: CPT | Performed by: SURGERY

## 2024-05-09 PROCEDURE — 99204 OFFICE O/P NEW MOD 45 MIN: CPT | Performed by: SURGERY

## 2024-05-09 ASSESSMENT — PAIN SCALES - GENERAL: PAINLEVEL: 0-NO PAIN

## 2024-05-09 ASSESSMENT — COLUMBIA-SUICIDE SEVERITY RATING SCALE - C-SSRS
1. IN THE PAST MONTH, HAVE YOU WISHED YOU WERE DEAD OR WISHED YOU COULD GO TO SLEEP AND NOT WAKE UP?: NO
2. HAVE YOU ACTUALLY HAD ANY THOUGHTS OF KILLING YOURSELF?: NO
6. HAVE YOU EVER DONE ANYTHING, STARTED TO DO ANYTHING, OR PREPARED TO DO ANYTHING TO END YOUR LIFE?: NO

## 2024-05-09 NOTE — PATIENT INSTRUCTIONS
- Left lower extremity arterial duplex to evaluate arterial flow, intramuscular hematoma, compartment edema. Suspect this is more musculoskeletal/traumatic in origin and not due to vascular disease  - Continue working on smoking cessation  - Followup in 2 weeks

## 2024-05-09 NOTE — PROGRESS NOTES
Vascular Surgery Clinic Note    CC: LLE pain    HPI:  Kayley Johnson is 58 y.o. female with history of LLE leg pain x several months. Patient states she tripped over her dog and had a left thigh muscle strain months ago that may have correlated to onset. Symptoms are not improving. She is awakening at night due to the leg pain. She denies any rest pain, claudication, or leg wounds. 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 She does smoke but has cut down to 10 cigarettes a day. No other vascular history; no hx of DVT/PE and is not on any anticoagulation    Past Vascular History:  None    Past Vascular Testing:  RAYNE MENSAH venous duplex (12/28/23) - no DVT bilaterally    Medical History:  Patient Active Problem List   Diagnosis    Herpes    Post-menopause bleeding    Tobacco use disorder        Meds:   Current Outpatient Medications on File Prior to Visit   Medication Sig Dispense Refill    albuterol (Ventolin HFA) 90 mcg/actuation inhaler Inhale 2 puffs every 4 hours if needed for wheezing or shortness of breath. 8 g 5    atorvastatin (Lipitor) 40 mg tablet Take 1 tablet (40 mg) by mouth once daily. 90 tablet 3    fluticasone propion-salmeteroL (Advair Diskus) 250-50 mcg/dose diskus inhaler Inhale 1 puff 2 times a day. Rinse mouth with water after use to reduce aftertaste and incidence of candidiasis. Do not swallow. 60 each 11    nicotine (Nicoderm CQ) 21 mg/24 hr patch Place 1 patch over 24 hours on the skin once every 24 hours. 30 patch 0     No current facility-administered medications on file prior to visit.        Allergies:   No Known Allergies    SH:    Social Determinants of Health     Tobacco Use: High Risk (5/9/2024)    Patient History     Smoking Tobacco Use: Every Day     Smokeless Tobacco Use: Never     Passive Exposure: Not on file   Alcohol Use: Not on file   Financial Resource Strain: Not on file   Food Insecurity: Not on file   Transportation Needs: Not on file   Physical Activity: Not  on file   Stress: Not on file   Social Connections: Not on file   Intimate Partner Violence: Not on file   Depression: Not at risk (5/9/2024)    PHQ-2     PHQ-2 Score: 0   Housing Stability: Not on file   Utilities: Not on file   Digital Equity: Not on file   Health Literacy: Not on file        FH:  Family History   Problem Relation Name Age of Onset    Mental illness Mother      Hypertension Mother      Dementia Mother      Cancer Father      Hypertension Father      Pancreatic cancer Father      No Known Problems Sister      No Known Problems Sister      No Known Problems Sister      No Known Problems Son      No Known Problems Son      Cancer Paternal Grandmother      Cancer Paternal Grandfather      Other (mouth cancer) Paternal Grandfather          ROS:  All systems were reviewed and are negative except as per HPI.    Objective:  Vitals:  Vitals:    05/09/24 0913   BP: 120/70   Pulse: 79   SpO2:         Exam:  Constitutional: normal, well appearing  HEENT: normocephalic  CV: regular rate  RESP: symmetric expansion, unlabored breathing  GI: soft, nontender, nondistended  MSK: normal ROM  INT: no lesions  PSYCH: appropriate mood  NEURO: no deficits  VASC: Strong femoral, DP and PT pulses bilaterally. No feet wounds. No change in pulses with dorsiflexion or plantar flexion. No pain with dorsiflexion    Assessment & Plan:  Kayley Johnson is 58 y.o. female with left lower leg pain    - Left lower extremity arterial duplex to evaluate arterial flow, intramuscular hematoma, compartment edema. Suspect this is more musculoskeletal/traumatic in origin and not due to vascular disease  - Continue working on smoking cessation  - Followup in 2 weeks      Meds  - Atorvastatin 40 mg    Screening/Surveillance  - LLE arterial duplex (May 2024)    Next Followup  - 2 weeks    Anum Dean MD

## 2024-05-13 ENCOUNTER — APPOINTMENT (OUTPATIENT)
Dept: VASCULAR SURGERY | Facility: HOSPITAL | Age: 59
End: 2024-05-13
Payer: COMMERCIAL

## 2024-05-27 ENCOUNTER — APPOINTMENT (OUTPATIENT)
Dept: VASCULAR MEDICINE | Facility: CLINIC | Age: 59
End: 2024-05-27
Payer: COMMERCIAL

## 2024-05-31 ENCOUNTER — APPOINTMENT (OUTPATIENT)
Dept: PULMONOLOGY | Facility: CLINIC | Age: 59
End: 2024-05-31
Payer: COMMERCIAL

## 2024-06-13 NOTE — PROGRESS NOTES
Patient: Kayley Johnson    [id number]  : 1965 -- AGE 58 y.o.    Provider: HEATHER Leyva     [facility]   Service Date: 2024       Department of Medicine  Division of Pulmonary, Critical Care, and Sleep Medicine       [facility]  New Visit Note    HISTORY OF PRESENT ILLNESS     The patient's referring provider is: Xiomara Hess, *    PCP: Dr. Xiomara Hess   Cardiology: Dr. Mays    HISTORY OF PRESENT ILLNESS   Kayley Johnson is a 58 y.o. female who presents to a [facility] for an evaluation with concerns of emphysema.  I have independently interviewed and examined the patient in the office and reviewed available records.    Current History  Patient presents to pulmonary clinic today after referral by primary care for concerns of emphysema.  CTA chest from 10/19/2023 showed no concerning pulmonary findings but did show moderate diffuse centrilobular emphysema.  Cardiac scoring CT from 3/6/2024 showed no gross evidence of mediastinal or hilar lymphadenopathy along with no notable lung nodules.  Visualized segments of the lungs did show mild to moderate emphysematous changes.  There is no PFT on record.    On today's visit, the patient reports no pulmonary symptoms at this time. Under a lot of stress due to her boyfriend going through cancer. Reports a dry cough. No wheezing. No SOB at rest or notable BROWN. Can walk for longer distances without issue. 3 years ago, she had a SOB episode while cutting her grass in a hot temperature. No current GERD. No lower leg edema. No CP, palpitations. Runny nose in the AM; otherwise not bothersome. Lost about 7 lbs over the past month unintentionally; believes this is related to a lot of stress lately. Has been given Advair 250 in the past; prescribed by PCP 2023 but not currently using. Same with Albuterol HFA.    Denies premature birth. No  childhood pulmonary issues growing up. No pulmonary hospitalizations. Has never been on home oxygen therapy before.    Has never completed a sleep study before. Reports snoring. No AM headaches. No daytime fatigue.     CAT Today: 1  ACT Today: 25  ESS Today: 5    Prior Notes & History       REVIEW OF SYSTEMS     REVIEW OF SYSTEMS  Review of Systems   Constitutional:  Negative for activity change, appetite change, chills, fatigue, fever and unexpected weight change.   HENT:  Negative for congestion, postnasal drip, rhinorrhea, sinus pressure, sinus pain, sneezing, sore throat, trouble swallowing and voice change.         Denies throat clearing   Eyes:  Negative for redness and itching.   Respiratory:  Negative for cough, chest tightness, shortness of breath, wheezing and stridor.    Cardiovascular:  Negative for chest pain, palpitations and leg swelling.        Denies orthopnea   Gastrointestinal:  Negative for abdominal pain, diarrhea, nausea and vomiting.        Denies acid reflux   Musculoskeletal:  Negative for arthralgias, back pain, joint swelling and myalgias.   Skin:  Negative for rash.   Allergic/Immunologic: Negative for immunocompromised state.   Neurological:  Negative for dizziness, tremors, weakness and headaches.   Hematological:  Does not bruise/bleed easily.   Psychiatric/Behavioral:  Negative for agitation and sleep disturbance. The patient is not nervous/anxious.         Denies depression   All other systems reviewed and are negative.      ALLERGIES AND MEDICATIONS     ALLERGIES  No Known Allergies    MEDICATIONS  Current Outpatient Medications   Medication Sig Dispense Refill    albuterol (Ventolin HFA) 90 mcg/actuation inhaler Inhale 2 puffs every 4 hours if needed for wheezing or shortness of breath. 8 g 5    atorvastatin (Lipitor) 40 mg tablet Take 1 tablet (40 mg) by mouth once daily. (Patient not taking: Reported on 6/14/2024) 90 tablet 3    fluticasone propion-salmeteroL (Advair Diskus)  250-50 mcg/dose diskus inhaler Inhale 1 puff 2 times a day. Rinse mouth with water after use to reduce aftertaste and incidence of candidiasis. Do not swallow. (Patient not taking: Reported on 6/14/2024) 60 each 11    nicotine (Nicoderm CQ) 21 mg/24 hr patch Place 1 patch over 24 hours on the skin once every 24 hours. (Patient not taking: Reported on 6/14/2024) 30 patch 0     No current facility-administered medications for this visit.       PAST HISTORY     PAST MEDICAL HISTORY  She  has no past medical history on file.    PAST SURGICAL HISTORY  History reviewed. No pertinent surgical history.    IMMUNIZATION HISTORY  Immunization History   Administered Date(s) Administered    DTaP vaccine, pediatric  (INFANRIX) 07/08/2011    Flu vaccine (IIV4), preservative free *Check age/dose* 10/12/2018, 10/03/2020, 09/27/2021    Flu vaccine, quadrivalent, high-dose, preservative free, age 65y+ (FLUZONE) 11/13/2023    Flu vaccine, quadrivalent, no egg protein, age 6 month or greater (FLUCELVAX) 10/21/2022    PPD Test 07/28/2017    Tdap vaccine, age 7 year and older (BOOSTRIX, ADACEL) 11/05/2007       SOCIAL HISTORY  She  reports that she quit smoking about 2 months ago. Her smoking use included cigarettes. She started smoking about 46 years ago. She has a 46.3 pack-year smoking history. She has never used smokeless tobacco. She reports that she does not currently use drugs. She reports that she does not drink alcohol.   Smoked on/off for the past 46 years.  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  DOES/DOES NOT: does have exposure to birds or exotic animals.  - Pet bird 4 years ago    FAMILY HISTORY  Family History   Problem Relation Name Age of Onset    Mental illness Mother      Hypertension Mother    "   Dementia Mother      Cancer Father      Hypertension Father      Pancreatic cancer Father      No Known Problems Sister      No Known Problems Sister      No Known Problems Sister      No Known Problems Son      No Known Problems Son      Cancer Paternal Grandmother      Cancer Paternal Grandfather      Other (mouth cancer) Paternal Grandfather           PHYSICAL EXAM     VITAL SIGNS: BP (!) 88/49   Pulse 80   Ht 1.626 m (5' 4\")   Wt 45.4 kg (100 lb)   SpO2 97%   BMI 17.16 kg/m²      PREVIOUS WEIGHTS:  Wt Readings from Last 3 Encounters:   06/14/24 45.4 kg (100 lb)   05/09/24 46.9 kg (103 lb 4.8 oz)   04/11/24 46.3 kg (102 lb)       Physical Exam  Vitals reviewed.   Constitutional:       General: She is not in acute distress.     Appearance: Normal appearance. She is not ill-appearing or toxic-appearing.   HENT:      Head: Normocephalic.      Nose: No rhinorrhea.   Cardiovascular:      Rate and Rhythm: Normal rate and regular rhythm.      Heart sounds: Normal heart sounds.   Pulmonary:      Effort: Pulmonary effort is normal. No respiratory distress.      Breath sounds: Normal breath sounds. No stridor. No wheezing, rhonchi or rales.      Comments: Mildly diminished lung sounds bilateral upper lobes  Abdominal:      General: Abdomen is flat.   Musculoskeletal:         General: Normal range of motion.      Right lower leg: No edema.      Left lower leg: No edema.   Skin:     General: Skin is warm and dry.      Nails: There is no clubbing.   Neurological:      General: No focal deficit present.      Mental Status: She is alert and oriented to person, place, and time.   Psychiatric:         Mood and Affect: Mood normal.         Behavior: Behavior normal.         Judgment: Judgment normal.         RESULTS/DATA     Pulmonary Function Test Results     No PFT on record    Chest Radiograph   CXR  9/18/23: IMPRESSION: 1. Hyperinflation of the lungs suspicious for COPD. No infiltrates    Chest CT Scan   Cardiac " "Scoring CT  3/6/24: IMPRESSION: Coronary artery calcium score of 83.82*. No gross evidence of mediastinal or hilar lymphadenopathy or masses is identified. The visualized segments of the lungs mild-to-moderate emphysematous changes with cysts up to 1-2 cm diameter, the largest in the right middle lobe similar to the prior exam.    CTA Chest  10/19/23: IMPRESSION: 1. No pulmonary embolus. 2. Moderate diffuse centrilobular emphysema. 3. No acute consolidation or evidence for acute thoracic pathology.      Echocardiogram & Cardiac Studies     No results found for this or any previous visit from the past 365 days.       Labwork & Pathology   Complete Blood Count  Lab Results   Component Value Date    WBC 13.4 (H) 10/19/2023    HGB 13.7 10/19/2023    HCT 41.7 10/19/2023    MCV 93 10/19/2023     10/19/2023       Peripheral Eosinophil Count:   Eosinophils Absolute (x10*3/uL)   Date Value   10/19/2023 0.23       Serum Immunoglobulin E:    No results found for: \"IGE\"     Metabolic Parameters  Sodium   Date/Time Value Ref Range Status   10/19/2023 05:01  133 - 145 mmol/L Final     Potassium   Date/Time Value Ref Range Status   10/19/2023 05:01 PM 4.5 3.4 - 5.1 mmol/L Final     Chloride   Date/Time Value Ref Range Status   10/19/2023 05:01  97 - 107 mmol/L Final     Bicarbonate   Date/Time Value Ref Range Status   10/19/2023 05:01 PM 26 24 - 31 mmol/L Final     Anion Gap   Date/Time Value Ref Range Status   10/19/2023 05:01 PM 10 <=19 mmol/L Final     Urea Nitrogen   Date/Time Value Ref Range Status   10/19/2023 05:01 PM 18 8 - 25 mg/dL Final     Creatinine   Date/Time Value Ref Range Status   10/19/2023 05:01 PM 0.70 0.40 - 1.60 mg/dL Final     Glucose   Date/Time Value Ref Range Status   10/19/2023 05:01 PM 96 65 - 99 mg/dL Final     Calcium   Date/Time Value Ref Range Status   10/19/2023 05:01 PM 9.6 8.5 - 10.4 mg/dL Final       Bronchoscopy & Pathology/Cultures       ASSESSMENT/PLAN     Ms. Johnson is a " 58 y.o. female; was referred to the [facility] for evaluation of emphysema.    Problem List and Orders  Diagnoses and all orders for this visit:  Pulmonary emphysema, unspecified emphysema type (Multi)  -     Referral to Pulmonology  Nicotine dependence, cigarettes, uncomplicated  -     nicotine (Nicoderm CQ) 21 mg/24 hr patch; 1 patch daily for 6 weeks  -     nicotine (Nicoderm CQ) 14 mg/24 hr patch; 1 patch daily for 2 weeks after completion of Step 1 (21 mg patches)  -     nicotine (Nicoderm CQ) 7 mg/24 hr patch; 1 patch daily for 2 weeks (following completion of Step 2; 14 mg patches)  -     nicotine polacrilex (Commit) 2 mg lozenge; Weeks 1-6: 1 lozenge every 1-2 hrs (max: 5 every 6 hours; 20/day). Weeks 7-9: 1 ernestina every 2-4 hrs (max: 5 every 6 hours; 20/day). Weeks 10-12: 1 every 4-8 hrs (max: 5 every 6 hours; 20/day).  -     CT lung screening low dose; Future      Assessment and Plan / Recommendations:  Emphysema: CTA chest from 10/19/2023 showed no concerning pulmonary findings but did show moderate diffuse centrilobular emphysema.  Cardiac scoring CT from 3/6/2024 showed no gross evidence of mediastinal or hilar lymphadenopathy along with no notable lung nodules.  Visualized segments of the lungs did show mild to moderate emphysematous changes.  -Asymptomatic from a pulmonary standpoint  -No need for PFT testing  -Explained that emphysema can continue to progress as long as she is still smoking    2. Nicotine Dependence: Has smoked on/off at 1 ppd for about 46 years. Recently has been under a lot of stress with her boyfriend going through cancer treatment; started smoking again. She does seem motivated to quit.  - >5 minutes spent on smoking cessation  - Starting nicotine replacement therapy with patches and lozenges; titration schedule provided to patient  - Ordering lung cancer screening Chest CT; due 3/7/25 or any date after  -Cigarette use is harming their health, and  specifically exacerbating the risk of primary lung cancer.  I have encouraged them regarding their current efforts to stop and recommended that they stop smoking entirely. I provided education and counseling regarding strategies to quit smoking.  I addressed barriers to change and suggested strategies to avoid relapse.  I recommended community support groups, and referred them to local tobacco cessation hotlines (9-800-Quit-Now). Given the duration of their cigarettes smoking they meet the eligibility criteria for lung cancer screening with a low-dose CT scan. We discussed the risks and benefits of screening. We discussed the importance of adhering to annual lung cancer screening with this method, the impact of comorbidities, and their ability or willingness to undergo diagnosis and treatment if abnormalities are discovered.    RTC after screening Chest CT in 3/2025

## 2024-06-14 ENCOUNTER — APPOINTMENT (OUTPATIENT)
Dept: PULMONOLOGY | Facility: CLINIC | Age: 59
End: 2024-06-14
Payer: COMMERCIAL

## 2024-06-14 VITALS
DIASTOLIC BLOOD PRESSURE: 49 MMHG | HEIGHT: 64 IN | OXYGEN SATURATION: 97 % | WEIGHT: 100 LBS | HEART RATE: 80 BPM | BODY MASS INDEX: 17.07 KG/M2 | SYSTOLIC BLOOD PRESSURE: 88 MMHG

## 2024-06-14 DIAGNOSIS — F17.210 NICOTINE DEPENDENCE, CIGARETTES, UNCOMPLICATED: ICD-10-CM

## 2024-06-14 DIAGNOSIS — J43.9 PULMONARY EMPHYSEMA, UNSPECIFIED EMPHYSEMA TYPE (MULTI): Primary | ICD-10-CM

## 2024-06-14 PROCEDURE — 99204 OFFICE O/P NEW MOD 45 MIN: CPT | Performed by: NURSE PRACTITIONER

## 2024-06-14 PROCEDURE — 99406 BEHAV CHNG SMOKING 3-10 MIN: CPT | Performed by: NURSE PRACTITIONER

## 2024-06-14 RX ORDER — NICOTINE 7MG/24HR
PATCH, TRANSDERMAL 24 HOURS TRANSDERMAL
Qty: 14 PATCH | Refills: 0 | Status: SHIPPED | OUTPATIENT
Start: 2024-06-14

## 2024-06-14 RX ORDER — IBUPROFEN 200 MG
TABLET ORAL
Qty: 42 PATCH | Refills: 0 | Status: SHIPPED | OUTPATIENT
Start: 2024-06-14

## 2024-06-14 RX ORDER — NICOTINE POLACRILEX 2 MG/1
LOZENGE ORAL
Qty: 200 LOZENGE | Refills: 2 | Status: SHIPPED | OUTPATIENT
Start: 2024-06-14

## 2024-06-14 RX ORDER — IBUPROFEN 200 MG
TABLET ORAL
Qty: 14 PATCH | Refills: 0 | Status: SHIPPED | OUTPATIENT
Start: 2024-06-14

## 2024-06-14 ASSESSMENT — ENCOUNTER SYMPTOMS
NAUSEA: 0
AGITATION: 0
BRUISES/BLEEDS EASILY: 0
JOINT SWELLING: 0
TROUBLE SWALLOWING: 0
BACK PAIN: 0
FEVER: 0
WEAKNESS: 0
EYE ITCHING: 0
VOICE CHANGE: 0
ARTHRALGIAS: 0
EYE REDNESS: 0
MYALGIAS: 0
ACTIVITY CHANGE: 0
SLEEP DISTURBANCE: 0
SINUS PRESSURE: 0
SORE THROAT: 0
PALPITATIONS: 0
CHILLS: 0
SINUS PAIN: 0
TREMORS: 0
FATIGUE: 0
STRIDOR: 0
HEADACHES: 0
COUGH: 0
ABDOMINAL PAIN: 0
SHORTNESS OF BREATH: 0
RHINORRHEA: 0
VOMITING: 0
ROS GI COMMENTS: DENIES ACID REFLUX
CHEST TIGHTNESS: 0
DIARRHEA: 0
UNEXPECTED WEIGHT CHANGE: 0
APPETITE CHANGE: 0
NERVOUS/ANXIOUS: 0
WHEEZING: 0
DIZZINESS: 0

## 2024-06-14 ASSESSMENT — PAIN SCALES - GENERAL: PAINLEVEL: 0-NO PAIN

## 2024-06-14 NOTE — PATIENT INSTRUCTIONS
Today we discussed her pulmonary history, symptoms and plan moving forward.    -Ordering lung cancer screening chest CT; can be completed 3/7/2025 or any day after  -You do have emphysema in your lungs; however you do not have any symptoms that is consistent with any concern for COPD.  No need for further testing at this time.    Nicotine Replacement Therapy: (You CANNOT smoke cigarettes while on this program)  (For patients smoking >10 cigarettes/day)    -Start Nicotine Patch Therapy: Begin with Step 1 (21 mg/day) for 6 weeks, followed by Step 2 (14 mg/day) for 2 weeks; finish with Step 3 (7 mg/day) for 2 weeks.    -Start Nicotine Lozenge Therapy: Weeks 1 to 6: 1 lozenge every 1 to 2 hours (maximum: 5 lozenges every 6 hours; 20 lozenges/day). Weeks 7 to 9: 1 lozenge every 2 to 4 hours (maximum: 5 lozenges every 6 hours; 20 lozenges/day). Weeks 10 to 12: 1 lozenge every 4 to 8 hours (maximum: 5 lozenges every 6 hours; 20 lozenges/day).    Lozenge Instructions:  Place the lozenge in your mouth, occasionally moving it side to side.  Allow it to slowly dissolve (about 20-30 minutes) and try to minimize swallowing  Do not chew or swallow the lozenge    -Cigarette/nicotine use is harming your health; and specifically exacerbating the risk of primary lung cancer. Smoking causes 85-90% of all lung cancers.  I encourage your current efforts to stop and recommended that you stop smoking entirely. I recommended community support groups, and use local tobacco cessation hotlines 2-800-Quit-Now (1-279.601.4647). If you are ready to quit, I advise you letting either your primary care provider or myself know so we can discuss smoking cessation techniques/treatments. *E-cigarettes should not replace smoking or be used to help quit smoking.    Thank you for visiting the pulmonary clinic today! It was a pleasure to participate in your care.  Please return to clinic after Chest CT in 3/2025 or sooner if needed.    Darryl Torres,  CNP  My Office Number: [phone]   CT Scheduling: [phone]  PFT/Follow Up Visit Scheduling: [phone]  My Nurse: ARIANA Sun  My [place]: [place]    To reach the nurse, Corinne Meier RN, please call ([phone]) Corinne has a secure voice mail account if you want to leave a message.    **For immediate needs such as medication issues/refills, active sick symptoms/medical concerns; I ask that you please call the office and speak to the pulmonary nurse. MyChart messages do not come directly to me. There can sometimes be a delay before I am aware of any messages that were sent. Thank you.**

## 2024-07-19 ENCOUNTER — ANCILLARY PROCEDURE (OUTPATIENT)
Dept: VASCULAR MEDICINE | Facility: CLINIC | Age: 59
End: 2024-07-19
Payer: COMMERCIAL

## 2024-07-19 DIAGNOSIS — M79.604 PAIN IN BOTH LOWER EXTREMITIES: ICD-10-CM

## 2024-07-19 DIAGNOSIS — M79.605 PAIN IN BOTH LOWER EXTREMITIES: ICD-10-CM

## 2024-07-19 PROCEDURE — 93971 EXTREMITY STUDY: CPT | Performed by: INTERNAL MEDICINE

## 2024-07-19 PROCEDURE — 93971 EXTREMITY STUDY: CPT

## 2024-08-26 ENCOUNTER — OFFICE VISIT (OUTPATIENT)
Dept: PRIMARY CARE | Facility: CLINIC | Age: 59
End: 2024-08-26
Payer: COMMERCIAL

## 2024-08-26 VITALS
TEMPERATURE: 98 F | BODY MASS INDEX: 16.87 KG/M2 | WEIGHT: 98.8 LBS | DIASTOLIC BLOOD PRESSURE: 80 MMHG | OXYGEN SATURATION: 96 % | SYSTOLIC BLOOD PRESSURE: 122 MMHG | HEIGHT: 64 IN | HEART RATE: 74 BPM

## 2024-08-26 DIAGNOSIS — H66.91 RIGHT OTITIS MEDIA, UNSPECIFIED OTITIS MEDIA TYPE: ICD-10-CM

## 2024-08-26 DIAGNOSIS — J01.90 ACUTE SINUSITIS, RECURRENCE NOT SPECIFIED, UNSPECIFIED LOCATION: Primary | ICD-10-CM

## 2024-08-26 DIAGNOSIS — J43.9 PULMONARY EMPHYSEMA, UNSPECIFIED EMPHYSEMA TYPE (MULTI): ICD-10-CM

## 2024-08-26 PROCEDURE — 3008F BODY MASS INDEX DOCD: CPT | Performed by: STUDENT IN AN ORGANIZED HEALTH CARE EDUCATION/TRAINING PROGRAM

## 2024-08-26 PROCEDURE — 99214 OFFICE O/P EST MOD 30 MIN: CPT | Performed by: STUDENT IN AN ORGANIZED HEALTH CARE EDUCATION/TRAINING PROGRAM

## 2024-08-26 RX ORDER — AMOXICILLIN AND CLAVULANATE POTASSIUM 875; 125 MG/1; MG/1
875 TABLET, FILM COATED ORAL 2 TIMES DAILY
Qty: 20 TABLET | Refills: 0 | Status: SHIPPED | OUTPATIENT
Start: 2024-08-26 | End: 2024-09-05

## 2024-08-26 ASSESSMENT — PATIENT HEALTH QUESTIONNAIRE - PHQ9
10. IF YOU CHECKED OFF ANY PROBLEMS, HOW DIFFICULT HAVE THESE PROBLEMS MADE IT FOR YOU TO DO YOUR WORK, TAKE CARE OF THINGS AT HOME, OR GET ALONG WITH OTHER PEOPLE: SOMEWHAT DIFFICULT
SUM OF ALL RESPONSES TO PHQ9 QUESTIONS 1 AND 2: 2
2. FEELING DOWN, DEPRESSED OR HOPELESS: SEVERAL DAYS
1. LITTLE INTEREST OR PLEASURE IN DOING THINGS: SEVERAL DAYS

## 2024-08-26 ASSESSMENT — PAIN SCALES - GENERAL: PAINLEVEL: 0-NO PAIN

## 2024-08-26 NOTE — PROGRESS NOTES
"Subjective   Kayley Johnson is a 59 y.o. female who presents for follow up ear infection/sinus.    HPI:      This is a 59-year-old female presenting for follow-up regarding ear infection/sinus symptoms.  She is a new patient, formally seen by Dr. Xiomara Tellez, last OV 4/11/2024.  Due for CPE.    R Ear Infection/Sinus Sx.  Hx of recurrent ear infection.  Thick yellow sinus drainage.  No fevers.    Boyfriend passed away in June.        ROS:   Review of systems is essentially negative for all systems except for any identified issues in HPI above.    Objective     /80   Pulse 74   Temp 36.7 °C (98 °F)   Ht 1.626 m (5' 4\")   Wt (!) 44.8 kg (98 lb 12.8 oz)   SpO2 96%   BMI 16.96 kg/m²      PHYSICAL EXAM    GENERAL  Well-appearing, pleasant and cooperative.  No acute distress.    HEENT  HEAD:   Normocephalic.  Atraumatic.  EYES:  PERRLA.  No scleral icterus or conjunctival injection.  EARS:  R TM erythematous and bulging.  L TM without erythema, fluid, or bulging.  NECK:  No adenopathy.  No palpable thyroid enlargement or nodules.    THROAT:  Moist oropharynx without tonsillar enlargement or exudates.    LUNGS:    Clear to auscultation bilaterally.  No wheezes, rales, rhonchi.    CARDIAC:  Regular rate and rhythm.  Normal S1S2.  No murmurs/rubs/gallops.    ABDOMEN:  Soft, non-tender, non-distended.  No hepatosplenomegaly.  Normoactive bowel sounds.    MUSCULOSKELETAL:  No gross abnormalities.   No joint swelling or erythema,.  No spinal or paraspinal tenderness to palpation.    EXTREMITIES:  No LE edema or cyanosis.      NEURO           Alert and oriented x3. No focal deficits.    PSYCH:          Affect appropriate.           Assessment/Plan   Problem List Items Addressed This Visit       Pulmonary emphysema (Multi)     Other Visit Diagnoses       Acute sinusitis, recurrence not specified, unspecified location    -  Primary    Relevant Medications    amoxicillin-pot clavulanate (Augmentin) 875-125 mg tablet    " Right otitis media, unspecified otitis media type        Relevant Medications    amoxicillin-pot clavulanate (Augmentin) 875-125 mg tablet          Counseling:   Medication education:    Education: The patient is counseled regarding potential side effects of all new medications.    Understanding:  Patient expressed understanding.    Adherence:  No barriers to adherence identified.         Marina Barrientos MD

## 2024-08-26 NOTE — PATIENT INSTRUCTIONS
It was a pleasure to meet you today.    Antibiotic prescription (Augmentin) sent to pharmacy.  Take twice daily for full 10 days.  Also recommend practicing good hydration habits and using over-the-counter Mucinex to further help clear mucus and relieve symptoms.    Follow-up with me in the next 1 to 2 months for yearly physical, sooner if needed.

## 2024-08-28 ENCOUNTER — TELEPHONE (OUTPATIENT)
Dept: PRIMARY CARE | Facility: CLINIC | Age: 59
End: 2024-08-28
Payer: COMMERCIAL

## 2024-08-28 DIAGNOSIS — Z12.31 ENCOUNTER FOR SCREENING MAMMOGRAM FOR MALIGNANT NEOPLASM OF BREAST: Primary | ICD-10-CM

## 2024-10-16 ENCOUNTER — APPOINTMENT (OUTPATIENT)
Dept: VASCULAR MEDICINE | Facility: CLINIC | Age: 59
End: 2024-10-16
Payer: COMMERCIAL

## 2024-10-16 ENCOUNTER — APPOINTMENT (OUTPATIENT)
Dept: CARDIOLOGY | Facility: CLINIC | Age: 59
End: 2024-10-16
Payer: COMMERCIAL

## 2024-10-29 PROBLEM — S76.212A STRAIN OF ADDUCTOR MUSCLE, FASCIA AND TENDON OF LEFT THIGH, INITIAL ENCOUNTER: Status: ACTIVE | Noted: 2023-10-05

## 2024-10-29 PROBLEM — R07.9 CHEST PAIN, UNSPECIFIED: Status: ACTIVE | Noted: 2023-09-18

## 2024-10-29 PROBLEM — H60.91 UNSPECIFIED OTITIS EXTERNA, RIGHT EAR: Status: ACTIVE | Noted: 2024-07-24

## 2024-10-30 ENCOUNTER — HOSPITAL ENCOUNTER (OUTPATIENT)
Dept: CARDIOLOGY | Facility: CLINIC | Age: 59
Discharge: HOME | End: 2024-10-30
Payer: COMMERCIAL

## 2024-10-30 ENCOUNTER — HOSPITAL ENCOUNTER (OUTPATIENT)
Dept: VASCULAR MEDICINE | Facility: CLINIC | Age: 59
Discharge: HOME | End: 2024-10-30
Payer: COMMERCIAL

## 2024-10-30 ENCOUNTER — OFFICE VISIT (OUTPATIENT)
Dept: CARDIOLOGY | Facility: CLINIC | Age: 59
End: 2024-10-30
Payer: COMMERCIAL

## 2024-10-30 VITALS
SYSTOLIC BLOOD PRESSURE: 135 MMHG | HEART RATE: 79 BPM | DIASTOLIC BLOOD PRESSURE: 76 MMHG | WEIGHT: 99.4 LBS | OXYGEN SATURATION: 95 % | HEIGHT: 64 IN | BODY MASS INDEX: 16.97 KG/M2

## 2024-10-30 DIAGNOSIS — R07.89 OTHER CHEST PAIN: ICD-10-CM

## 2024-10-30 DIAGNOSIS — R07.9 CHEST PAIN, UNSPECIFIED TYPE: ICD-10-CM

## 2024-10-30 DIAGNOSIS — Z13.6 ENCOUNTER FOR SCREENING FOR CARDIOVASCULAR DISORDERS: ICD-10-CM

## 2024-10-30 DIAGNOSIS — F17.200 TOBACCO USE DISORDER: Primary | ICD-10-CM

## 2024-10-30 LAB
EJECTION FRACTION APICAL 4 CHAMBER: 67
EJECTION FRACTION: 63 %
LEFT ATRIUM VOLUME AREA LENGTH INDEX BSA: 31.1 ML/M2
LEFT VENTRICLE INTERNAL DIMENSION DIASTOLE: 3.71 CM (ref 3.5–6)
LEFT VENTRICULAR OUTFLOW TRACT DIAMETER: 2.1 CM
MITRAL VALVE E/A RATIO: 0.83
RIGHT VENTRICLE FREE WALL PEAK S': 11.98 CM/S
RIGHT VENTRICLE PEAK SYSTOLIC PRESSURE: 30.9 MMHG
TRICUSPID ANNULAR PLANE SYSTOLIC EXCURSION: 1.8 CM

## 2024-10-30 PROCEDURE — 76706 US ABDL AORTA SCREEN AAA: CPT | Performed by: SURGERY

## 2024-10-30 PROCEDURE — 3008F BODY MASS INDEX DOCD: CPT | Performed by: NURSE PRACTITIONER

## 2024-10-30 PROCEDURE — 76706 US ABDL AORTA SCREEN AAA: CPT

## 2024-10-30 PROCEDURE — 99214 OFFICE O/P EST MOD 30 MIN: CPT | Performed by: NURSE PRACTITIONER

## 2024-10-30 PROCEDURE — 93306 TTE W/DOPPLER COMPLETE: CPT

## 2024-10-30 PROCEDURE — 93306 TTE W/DOPPLER COMPLETE: CPT | Performed by: INTERNAL MEDICINE

## 2024-10-30 PROCEDURE — 99214 OFFICE O/P EST MOD 30 MIN: CPT | Mod: 25 | Performed by: NURSE PRACTITIONER

## 2024-10-30 RX ORDER — ATORVASTATIN CALCIUM 40 MG/1
40 TABLET, FILM COATED ORAL DAILY
COMMUNITY

## 2024-10-30 RX ORDER — ASPIRIN 81 MG/1
81 TABLET ORAL DAILY
COMMUNITY

## 2024-10-30 ASSESSMENT — ENCOUNTER SYMPTOMS
DEPRESSION: 0
OCCASIONAL FEELINGS OF UNSTEADINESS: 0
RESPIRATORY NEGATIVE: 1
NEUROLOGICAL NEGATIVE: 1
LOSS OF SENSATION IN FEET: 0
PALPITATIONS: 1
MUSCULOSKELETAL NEGATIVE: 1
CONSTITUTIONAL NEGATIVE: 1
GASTROINTESTINAL NEGATIVE: 1

## 2024-10-30 ASSESSMENT — PAIN SCALES - GENERAL: PAINLEVEL_OUTOF10: 0-NO PAIN

## 2024-11-01 ENCOUNTER — TELEPHONE (OUTPATIENT)
Dept: CARDIOLOGY | Facility: CLINIC | Age: 59
End: 2024-11-01
Payer: COMMERCIAL

## 2024-11-04 ENCOUNTER — TELEPHONE (OUTPATIENT)
Dept: CARDIOLOGY | Facility: CLINIC | Age: 59
End: 2024-11-04
Payer: COMMERCIAL

## 2024-11-06 ENCOUNTER — OFFICE VISIT (OUTPATIENT)
Dept: URGENT CARE | Age: 59
End: 2024-11-06
Payer: COMMERCIAL

## 2024-11-06 ENCOUNTER — ANCILLARY PROCEDURE (OUTPATIENT)
Dept: URGENT CARE | Age: 59
End: 2024-11-06
Payer: COMMERCIAL

## 2024-11-06 VITALS
OXYGEN SATURATION: 97 % | SYSTOLIC BLOOD PRESSURE: 132 MMHG | TEMPERATURE: 96.8 F | RESPIRATION RATE: 18 BRPM | HEIGHT: 64 IN | WEIGHT: 99 LBS | HEART RATE: 79 BPM | BODY MASS INDEX: 16.9 KG/M2 | DIASTOLIC BLOOD PRESSURE: 77 MMHG

## 2024-11-06 DIAGNOSIS — J01.00 ACUTE NON-RECURRENT MAXILLARY SINUSITIS: Primary | ICD-10-CM

## 2024-11-06 DIAGNOSIS — R05.9 COUGH, UNSPECIFIED TYPE: ICD-10-CM

## 2024-11-06 PROCEDURE — 99213 OFFICE O/P EST LOW 20 MIN: CPT | Performed by: NURSE PRACTITIONER

## 2024-11-06 PROCEDURE — 71046 X-RAY EXAM CHEST 2 VIEWS: CPT | Performed by: NURSE PRACTITIONER

## 2024-11-06 RX ORDER — AMOXICILLIN AND CLAVULANATE POTASSIUM 875; 125 MG/1; MG/1
1 TABLET, FILM COATED ORAL 2 TIMES DAILY
Qty: 20 TABLET | Refills: 0 | Status: SHIPPED | OUTPATIENT
Start: 2024-11-06 | End: 2024-11-16

## 2024-11-06 ASSESSMENT — ENCOUNTER SYMPTOMS
COUGH: 1
SINUS PRESSURE: 1
SINUS PAIN: 1

## 2024-11-06 NOTE — PATIENT INSTRUCTIONS
Augmentin  OTC probiotic  If worsening, please go to ER    Please follow up with your primary provider within one week if symptoms do not improve.  You may schedule an appointment online at [email or web address] or call [phone]. Go to the Emergency Department if symptoms significantly worsen or if you develop chest pain or shortness of breath.

## 2024-11-07 NOTE — PROGRESS NOTES
Subjective   Patient ID: Kayley Johnson is a 59 y.o. female. They present today with a chief complaint of No chief complaint on file..    History of Present Illness  Kayley Johnson is a 59 y.o. female who presents to the clinic for cough, ear pain, sinus pressure for the past three weeks.  Pt denies any chest pain, sob, N/V at this time in clinic.             Past Medical History  Allergies as of 11/06/2024    (No Known Allergies)       (Not in a hospital admission)       No past medical history on file.    Past Surgical History:   Procedure Laterality Date    OTHER SURGICAL HISTORY      thumb        reports that she has been smoking cigarettes. She started smoking about 46 years ago. She has a 46.7 pack-year smoking history. She has never used smokeless tobacco. She reports that she does not currently use drugs. She reports that she does not drink alcohol.    Review of Systems  Review of Systems   HENT:  Positive for ear pain, sinus pressure and sinus pain.    Respiratory:  Positive for cough.    All other systems reviewed and are negative.                                 Objective    There were no vitals filed for this visit.  No LMP recorded.    Physical Exam  Constitutional:       Appearance: Normal appearance.   HENT:      Right Ear: Tympanic membrane normal.      Left Ear: Tympanic membrane normal.      Nose:      Right Sinus: Maxillary sinus tenderness and frontal sinus tenderness present.      Left Sinus: Maxillary sinus tenderness and frontal sinus tenderness present.   Eyes:      Extraocular Movements: Extraocular movements intact.      Conjunctiva/sclera: Conjunctivae normal.      Pupils: Pupils are equal, round, and reactive to light.   Cardiovascular:      Rate and Rhythm: Normal rate and regular rhythm.   Pulmonary:      Effort: Pulmonary effort is normal.      Breath sounds: Normal breath sounds.   Neurological:      Mental Status: She is alert.         Procedures    Point of Care Test & Imaging Results  from this visit  No results found for this visit on 11/06/24.   XR chest 2 views    Result Date: 11/6/2024  Interpreted By:  Juan Carlos Ramos, STUDY: XR CHEST 2 VIEWS;  11/6/2024 3:57 pm   INDICATION: Signs/Symptoms:cough.   ,R05.9 Cough, unspecified   COMPARISON: 09/18/2023   ACCESSION NUMBER(S): [id number]   ORDERING CLINICIAN: JUAN CARLOS VANG   FINDINGS: PA and lateral radiographs of the chest were provided.       CARDIOMEDIASTINAL SILHOUETTE: Cardiomediastinal silhouette is normal in size and configuration.   LUNGS: Lungs are clear.   ABDOMEN: No remarkable upper abdominal findings.   BONES: No acute osseous changes.       1.  No evidence of acute cardiopulmonary process.       MACRO: None   Signed by: Juan Carlos Ramos 11/6/2024 4:09 PM Dictation workstation:   [id number]     Diagnostic study results (if any) were reviewed by HEATHER Damon.    Assessment/Plan   Allergies, medications, history, and pertinent labs/EKGs/Imaging reviewed by HEATHER Damon.     Medical Decision Making  History and examination consistent with acute uncomplicated sinusitis. No indication for labs or imaging at this time. No evidence of sepsis, strep pharyngitis, or pneumonia. Counseled patient/family on antibiotic treatment and supportive measures at home. Patient advised to return to clinic or present to ED if symptoms change or worsen. Otherwise follow-up with PCP. Patient verbalized understanding and agrees with plan.     Orders and Diagnoses  Diagnoses and all orders for this visit:  Acute non-recurrent maxillary sinusitis  -     amoxicillin-pot clavulanate (Augmentin) 875-125 mg tablet; Take 1 tablet by mouth 2 times a day for 10 days.  Cough, unspecified type  -     XR chest 2 views      Medical Admin Record      Patient disposition: Home    Electronically signed by HEATHER Damon  9:25 PM

## 2024-11-18 DIAGNOSIS — E78.5 HYPERLIPIDEMIA, UNSPECIFIED HYPERLIPIDEMIA TYPE: ICD-10-CM

## 2024-11-18 DIAGNOSIS — R07.9 CHEST PAIN, UNSPECIFIED TYPE: Primary | ICD-10-CM

## 2024-11-18 RX ORDER — ATORVASTATIN CALCIUM 40 MG/1
40 TABLET, FILM COATED ORAL DAILY
Qty: 90 TABLET | Refills: 3 | Status: SHIPPED | OUTPATIENT
Start: 2024-11-18 | End: 2025-11-18

## 2024-11-18 RX ORDER — ASPIRIN 81 MG/1
81 TABLET ORAL DAILY
Qty: 90 TABLET | Refills: 3 | Status: SHIPPED | OUTPATIENT
Start: 2024-11-18 | End: 2025-11-18

## 2025-01-24 ENCOUNTER — OFFICE VISIT (OUTPATIENT)
Dept: PRIMARY CARE | Facility: EXTERNAL LOCATION | Age: 60
End: 2025-01-24

## 2025-01-24 VITALS
SYSTOLIC BLOOD PRESSURE: 142 MMHG | WEIGHT: 99 LBS | BODY MASS INDEX: 16.99 KG/M2 | OXYGEN SATURATION: 97 % | DIASTOLIC BLOOD PRESSURE: 86 MMHG | TEMPERATURE: 98.6 F | HEART RATE: 86 BPM

## 2025-01-24 DIAGNOSIS — J01.90 ACUTE SINUSITIS, RECURRENCE NOT SPECIFIED, UNSPECIFIED LOCATION: Primary | ICD-10-CM

## 2025-01-24 RX ORDER — DOXYCYCLINE 100 MG/1
100 CAPSULE ORAL 2 TIMES DAILY
Qty: 14 CAPSULE | Refills: 0 | Status: SHIPPED | OUTPATIENT
Start: 2025-01-24 | End: 2025-01-31

## 2025-01-24 ASSESSMENT — ENCOUNTER SYMPTOMS
SINUS PRESSURE: 1
PALPITATIONS: 0
SHORTNESS OF BREATH: 0
COUGH: 1
WHEEZING: 0
CHILLS: 0
FEVER: 0

## 2025-01-24 NOTE — PROGRESS NOTES
Subjective   Patient ID: Kayley Johnson is a 59 y.o. female who presents for Nasal Congestion, Cough (productive), and Sinusitis.    HPI:  Complains of cough, congestion and sinus pressure. Went to   11/2024 and was given Augmentin which helped. Had chest XRAY at that time and was clear.    Reports a few weeks later symptoms started again.     Complains of sinus pressure,congestion, thick sputum and cough for 1 month.   No chest pain or SOB.   No fever or chills.     Does smoke. Follows with pulmonology.     No Known Allergies    Current Outpatient Medications   Medication Sig Dispense Refill    albuterol (Ventolin HFA) 90 mcg/actuation inhaler Inhale 2 puffs every 4 hours if needed for wheezing or shortness of breath. 8 g 5    aspirin 81 mg EC tablet Take 1 tablet (81 mg) by mouth once daily. 90 tablet 3    atorvastatin (Lipitor) 40 mg tablet Take 1 tablet (40 mg) by mouth once daily. 90 tablet 3     No current facility-administered medications for this visit.        History reviewed. No pertinent past medical history.    Past Surgical History:   Procedure Laterality Date    OTHER SURGICAL HISTORY      thumb       Review of Systems   Constitutional:  Negative for chills and fever.   HENT:  Positive for congestion, postnasal drip and sinus pressure. Negative for ear pain.    Respiratory:  Positive for cough. Negative for shortness of breath and wheezing.    Cardiovascular:  Negative for chest pain, palpitations and leg swelling.       Objective   Visit Vitals  /86   Pulse 86   Temp 37 °C (98.6 °F)   Wt (!) 44.9 kg (99 lb)   SpO2 97%   BMI 16.99 kg/m²   OB Status Postmenopausal   Smoking Status Every Day   BSA 1.42 m²       Physical Exam  Constitutional:       General: She is not in acute distress.     Appearance: Normal appearance. She is not ill-appearing or toxic-appearing.      Comments: Sounds congested. NAD. Talking in complete sentences without difficulty.    HENT:      Right Ear: Tympanic membrane, ear  canal and external ear normal.      Left Ear: Tympanic membrane, ear canal and external ear normal.      Nose: Congestion present.      Mouth/Throat:      Mouth: Mucous membranes are moist.      Pharynx: Oropharynx is clear. No oropharyngeal exudate or posterior oropharyngeal erythema.   Eyes:      General:         Right eye: No discharge.         Left eye: No discharge.      Extraocular Movements: Extraocular movements intact.      Conjunctiva/sclera: Conjunctivae normal.   Cardiovascular:      Rate and Rhythm: Normal rate and regular rhythm.   Pulmonary:      Effort: Pulmonary effort is normal. No respiratory distress.      Breath sounds: Normal breath sounds. No wheezing, rhonchi or rales.   Musculoskeletal:      Cervical back: Neck supple.   Neurological:      General: No focal deficit present.      Mental Status: She is alert.   Psychiatric:         Behavior: Behavior normal.         Thought Content: Thought content normal.         Assessment/Plan   Diagnoses and all orders for this visit:  Acute sinusitis, recurrence not specified, unspecified location  -     doxycycline (Monodox) 100 mg capsule; Take 1 capsule (100 mg) by mouth 2 times a day for 7 days. Take with at least 8 ounces (large glass) of water, do not lie down for 30 minutes after    - Completed Augmentin in November. Will send Doxy.  Possible side effects and interactions discussed.  - Recommend probiotic while on antibiotic.   - Encouraged smoking cessation.   - Recommend probiotic while on antibiotic.   - Recommend Flonase daily, Mucinex as needed, increase liquid intake and use humidified air in sleeping areas.   - Follow up with PCP in 3-5 days if no improvement.  Can be seen sooner in UC with concerns or with any worsening symptoms.   - Recommend annual exams/preventative screenings with PCP. Discussed care gaps with patient.

## 2025-07-25 ENCOUNTER — EVALUATION (OUTPATIENT)
Dept: PHYSICAL THERAPY | Facility: CLINIC | Age: 60
End: 2025-07-25
Payer: COMMERCIAL

## 2025-07-25 DIAGNOSIS — M76.892 HIP FLEXOR TENDINITIS, LEFT: Primary | ICD-10-CM

## 2025-07-25 PROCEDURE — 97110 THERAPEUTIC EXERCISES: CPT | Mod: GP | Performed by: PHYSICAL THERAPIST

## 2025-07-25 PROCEDURE — 97161 PT EVAL LOW COMPLEX 20 MIN: CPT | Mod: GP | Performed by: PHYSICAL THERAPIST

## 2025-07-25 SDOH — ECONOMIC STABILITY: GENERAL: QUALITY OF LIFE: EXCELLENT

## 2025-07-25 ASSESSMENT — ENCOUNTER SYMPTOMS
OCCASIONAL FEELINGS OF UNSTEADINESS: 0
ALLEVIATING FACTORS: ICE
PAIN SCALE AT HIGHEST: 3
LOSS OF SENSATION IN FEET: 0
DEPRESSION: 0
PAIN SCALE AT LOWEST: 3
PAIN SCALE: 3
QUALITY: BURNING

## 2025-07-25 NOTE — PROGRESS NOTES
Physical Therapy Evaluation and Treatment     Patient Name: Kayley Johnson  MRN: [id number]  Encounter date: 7/25/2025  Time Calculation  Start Time: 0800  Stop Time: 0845  Time Calculation (min): 45 min  PT Evaluation Time Entry  PT Evaluation (Low) Time Entry: 30  PT Therapeutic Procedures Time Entry  Therapeutic Exercise Time Entry: 15  Low complexity due to patient's clinical presentation being stable and uncomplicated by any significant comorbidities that may affect rehab tolerance and progression.     Visit # 1 of 8  Visits/Dates Authorized: Please c07/23/2025 mmo: TS: NO AUTH/$25 CO-PAY/100% COVERAGE/$1000 OOP (NOT MET)/AVAILITY TRANSACTION ID: [id number]  ontact patient and update insurance coverage.  [facility] is no longer active.  Thanks!  Insurance Type: Payor: [employer] [facility] / Plan: [employer] / Product Type: *No Product type* /     Current Problem:   Problem List Items Addressed This Visit    None  Visit Diagnoses         Codes      Hip flexor tendinitis, left    -  Primary M76.892    Relevant Orders    Follow Up In Physical Therapy          Precautions:  Precautions  STEADI Fall Risk Score (The score of 4 or more indicates an increased risk of falling): 1  Precautions Comment: emphysema  Past Medical History Relevant to Rehab: Pulmonary emphysema, nicotine dependence, hypercholesterolemia, strain of adductor muscle, fascia and tendon of L thigh, hip flexor tendiniti    Subjective    Subjective Evaluation    History of Present Illness  Date of onset: 7/10/2025  Mechanism of injury: Pt 58 yo F with c/o L hip pain.  Pt was running around after kids and then dog wrap leash around ankle    Quality of life: excellent    Pain  Current pain rating: 3 (pressure not pain)  At best pain rating: 3  At worst pain rating: 3  Location: L hip and pressure  Quality: burning  Relieving factors: ice  Exacerbated by: hard to say.  Progression: no change    Social Support  Patient lives at: [facility] with  laundry on main level and bed and bath.  Lives with: alone ([facility])    Hand dominance: right    Patient Goals  Patient goal: get rid of pressur feeling         Objective      Objective     Palpation   Left   Tenderness of the iliopsoas.     Active Range of Motion     Additional Active Range of Motion Details  Hector prema L 65 degrees   Hector test R 68 degrees    Strength/Myotome Testing     Additional Strength Details  R hip flex 4+  L hip 4-  Knee ext flex 5/5 and DF L 5/5    SLR with no pain  Bridge 3/4 range                   Outcome Measures:   LEFS 79/80   5TSTS 12.31 sec  Treatments:  Therapeutic Exercise  Therapeutic Exercise Performed: Yes  Therapeutic Exercise Activity 1: modified hector stretch x3 20 ct hold  Therapeutic Exercise Activity 2: clamshell 2 x10 3 ct hold    HEP / Access Codes:   Access Code: [email or web address]  URL: [email or web address]  Date: 07/25/2025  Prepared by: Vale Thomas    Exercises  - Modified Hector Stretch  - 1 x daily - 7 x weekly - 1 sets - 3-5 reps - 20 hold  - Clamshell  - 1 x daily - 7 x weekly - 3 sets - 10 reps - 3 hold  Assessment   Assessment & Plan     Assessment  Impairments: abnormal or restricted ROM, impaired physical strength, lacks appropriate home exercise program and pain with function  Assessment details: Pt is a 60 yo F with tendinitis of L poas.  Pt with c/o pressure discomfort vs pain.  Pt (+) hector test B.  Pt also with quad and HS inflexibility.  Pt with weakness L hip flexors and glutes.  Pt will benefit from skilled pt in order to address hip discomfort, hip flexor tightness and weakness in order to help her manage her symptoms  Prognosis: good    Goals  get rid of pressur feeling    Plan  Therapy options: will be seen for skilled physical therapy services  Planned modality interventions: cryotherapy, thermotherapy (hydrocollator packs) and ultrasound  Other planned modality interventions: K tape, DN, cupping  Planned therapy interventions: abdominal  trunk stabilization, manual therapy, neuromuscular re-education, soft tissue mobilization, strengthening, stretching, therapeutic activities, joint mobilization, home exercise program, flexibility and functional ROM exercises           Goals:   Active       PT Problem       PT Goals       Start:  07/25/25    Expected End:  10/23/25       ?  SHORT TERM GOALS:  Patient will report decrease in pain to </= 0/10 to improve quality of life.   Patient will improve L AROM to WFL in order to improve gait mechanics and functional mobility  Patient will demonstrate sit to stand x10 without UE to demonstrate improved LE strength.  Patient independent with prescribed HEP    LONG TERM GOALS:  Patient will be independent with HEP to promote self management of condition  Patient will perform reciprocal stair negotiation to demonstrate improved LE strength.   Patient will ambulate with least restrictive device and proper gait mechanics to promote return to prior level of function.    Functional Level - reported % (hobbies/work/recreation)    5 TSTS in <12 sec to demonstrate increased LE strength and improved balance         Patient Stated Goal 1       Start:  07/25/25    Expected End:  10/23/25       Stop feeling pressure L hip and lower leg

## 2025-08-08 ENCOUNTER — TREATMENT (OUTPATIENT)
Dept: PHYSICAL THERAPY | Facility: CLINIC | Age: 60
End: 2025-08-08
Payer: COMMERCIAL

## 2025-08-08 ENCOUNTER — OFFICE VISIT (OUTPATIENT)
Dept: URGENT CARE | Age: 60
End: 2025-08-08
Payer: COMMERCIAL

## 2025-08-08 VITALS
BODY MASS INDEX: 17.07 KG/M2 | OXYGEN SATURATION: 98 % | SYSTOLIC BLOOD PRESSURE: 129 MMHG | WEIGHT: 100 LBS | HEART RATE: 61 BPM | RESPIRATION RATE: 20 BRPM | DIASTOLIC BLOOD PRESSURE: 73 MMHG | HEIGHT: 64 IN | TEMPERATURE: 98.3 F

## 2025-08-08 DIAGNOSIS — M76.892 HIP FLEXOR TENDINITIS, LEFT: ICD-10-CM

## 2025-08-08 DIAGNOSIS — T14.8XXA SUPERFICIAL ABRASION: Primary | ICD-10-CM

## 2025-08-08 PROCEDURE — 99213 OFFICE O/P EST LOW 20 MIN: CPT | Performed by: PHYSICIAN ASSISTANT

## 2025-08-08 PROCEDURE — 3008F BODY MASS INDEX DOCD: CPT | Performed by: PHYSICIAN ASSISTANT

## 2025-08-08 PROCEDURE — 97110 THERAPEUTIC EXERCISES: CPT | Mod: GP | Performed by: PHYSICAL THERAPIST

## 2025-08-08 NOTE — PROGRESS NOTES
Physical Therapy Treatment    Patient Name: Kayley Johnson  MRN: [id number]  YOB: 1965  Encounter Date: 8/8/2025    Time Entry:  Time Calculation  Start Time: 0950  Stop Time: 1030  Time Calculation (min): 40 min     PT Therapeutic Procedures Time Entry  Therapeutic Exercise Time Entry: 40                   Rehab Insurance Information:      Auth Required: No  Authorized Visits:  ([place])        Additional Authorization/Insurance Information: Please c07/23/2025 mmo: TS: NO AUTH/$25 CO-PAY/100% COVERAGE/$1000 OOP (NOT MET)/AVAILITY TRANSACTION ID: [id number] ontact patient and update insurance coverage.  [facility] is no longer active.  Thanks!    Rehab Falls Risk Assessment:  Fall Risk Indicated: No      Problem List Items Addressed This Visit    None  Visit Diagnoses         Codes      Hip flexor tendinitis, left     M76.892                 Subjective   Pt states that the stretching is helping.  Pain reported as 1.           Objective    Pt enters with injury to her L hand finger, hand near pisiform, and inner arm.  Pt wishes to continue with therapy            Activities   Therapeutic Exercise  Therapeutic Exercise Performed: Yes  Therapeutic Exercise Activity 1: Nustep L3 LE only  Therapeutic Exercise Activity 2: HC stretch x3 20 ct hold on incline  Therapeutic Exercise Activity 3: Modified wili stretch x3 20 ct hold R and L  Therapeutic Exercise Activity 4: Piriformis stretch x3 20 ct hold  Therapeutic Exercise Activity 5: h/l add with ball 2 x10 5 ct hold  Therapeutic Exercise Activity 6: h/l abd with GTB 2 x10 5 ct hold  Therapeutic Exercise Activity 7: clamshell 2x10 5 ct hold  R and L                                                       Education             Access Code: [email or web address] URL: [email or web address] Date: 08/08/2025 Prepared by: Vale Thomas Exercises - Supine Figure 4 Piriformis Stretch  - 1 x daily - 7 x weekly - 1 sets - 3-5 reps - 20 hold - Supine Hip Adduction Isometric with Ball  - 1  x daily - 7 x weekly - 3 sets - 10 reps - 5 hold - Hooklying Clamshell with Resistance  - 1 x daily - 7 x weekly - 3 sets - 10 reps - 5 hold      Assessment/Plan   Assessment: Pt with good understanding of additional HEP exercises today Patient tolerated treatment well      Plan: continue with stretching seated hip flexor stretch, standing HS and HC stretching, step ups, 3 way hip light resistance      Active       PT Goals       Start:  07/25/25    Expected End:  10/23/25       ?  SHORT TERM GOALS:  Patient will report decrease in pain to </= 0/10 to improve quality of life.   Patient will improve L AROM to WFL in order to improve gait mechanics and functional mobility  Patient will demonstrate sit to stand x10 without UE to demonstrate improved LE strength.  Patient independent with prescribed HEP    LONG TERM GOALS:  Patient will be independent with HEP to promote self management of condition  Patient will perform reciprocal stair negotiation to demonstrate improved LE strength.   Patient will ambulate with least restrictive device and proper gait mechanics to promote return to prior level of function.    Functional Level - reported % (hobbies/work/recreation)    5 TSTS in <12 sec to demonstrate increased LE strength and improved balance         Patient Stated Goal 1       Start:  07/25/25    Expected End:  10/23/25       Stop feeling pressure L hip and lower leg
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
## 2025-08-14 ENCOUNTER — TREATMENT (OUTPATIENT)
Dept: PHYSICAL THERAPY | Facility: CLINIC | Age: 60
End: 2025-08-14
Payer: COMMERCIAL

## 2025-08-14 DIAGNOSIS — M76.892 HIP FLEXOR TENDINITIS, LEFT: ICD-10-CM

## 2025-08-14 PROCEDURE — 97110 THERAPEUTIC EXERCISES: CPT | Mod: GP | Performed by: PHYSICAL THERAPIST

## 2025-08-20 ENCOUNTER — TREATMENT (OUTPATIENT)
Dept: PHYSICAL THERAPY | Facility: CLINIC | Age: 60
End: 2025-08-20
Payer: COMMERCIAL

## 2025-08-20 DIAGNOSIS — M76.892 HIP FLEXOR TENDINITIS, LEFT: ICD-10-CM

## 2025-08-20 PROCEDURE — 97140 MANUAL THERAPY 1/> REGIONS: CPT | Mod: GP,CQ

## 2025-08-20 PROCEDURE — 97110 THERAPEUTIC EXERCISES: CPT | Mod: GP,CQ

## 2025-08-27 ENCOUNTER — TREATMENT (OUTPATIENT)
Dept: PHYSICAL THERAPY | Facility: CLINIC | Age: 60
End: 2025-08-27
Payer: COMMERCIAL

## 2025-08-27 DIAGNOSIS — M76.892 HIP FLEXOR TENDINITIS, LEFT: ICD-10-CM

## 2025-08-27 PROCEDURE — 97110 THERAPEUTIC EXERCISES: CPT | Mod: GP | Performed by: PHYSICAL THERAPIST

## 2025-09-03 ENCOUNTER — TREATMENT (OUTPATIENT)
Dept: PHYSICAL THERAPY | Facility: CLINIC | Age: 60
End: 2025-09-03
Payer: COMMERCIAL

## 2025-09-03 DIAGNOSIS — M76.892 HIP FLEXOR TENDINITIS, LEFT: ICD-10-CM

## 2025-09-03 PROCEDURE — 97110 THERAPEUTIC EXERCISES: CPT | Mod: GP | Performed by: PHYSICAL THERAPIST

## 2025-10-07 ENCOUNTER — APPOINTMENT (OUTPATIENT)
Dept: OTOLARYNGOLOGY | Facility: CLINIC | Age: 60
End: 2025-10-07
Payer: COMMERCIAL